# Patient Record
Sex: FEMALE | Race: WHITE | Employment: FULL TIME | URBAN - METROPOLITAN AREA
[De-identification: names, ages, dates, MRNs, and addresses within clinical notes are randomized per-mention and may not be internally consistent; named-entity substitution may affect disease eponyms.]

---

## 2017-02-20 ENCOUNTER — OFFICE VISIT (OUTPATIENT)
Dept: FAMILY MEDICINE CLINIC | Age: 52
End: 2017-02-20

## 2017-02-20 VITALS
TEMPERATURE: 97.7 F | BODY MASS INDEX: 38.54 KG/M2 | SYSTOLIC BLOOD PRESSURE: 125 MMHG | DIASTOLIC BLOOD PRESSURE: 77 MMHG | HEIGHT: 66 IN | OXYGEN SATURATION: 97 % | RESPIRATION RATE: 16 BRPM | WEIGHT: 239.8 LBS | HEART RATE: 84 BPM

## 2017-02-20 DIAGNOSIS — J33.8 NASAL SINUS POLYP: ICD-10-CM

## 2017-02-20 DIAGNOSIS — J32.0 SINUSITIS, MAXILLARY, CHRONIC: ICD-10-CM

## 2017-02-20 DIAGNOSIS — J45.990 ASTHMA, EXERCISE INDUCED: ICD-10-CM

## 2017-02-20 DIAGNOSIS — Z01.818 PREOP EXAMINATION: Primary | ICD-10-CM

## 2017-02-20 PROCEDURE — 99243 OFF/OP CNSLTJ NEW/EST LOW 30: CPT | Performed by: FAMILY MEDICINE

## 2017-02-20 RX ORDER — VILAZODONE HYDROCHLORIDE 10 MG/1
10 TABLET ORAL DAILY
COMMUNITY
End: 2018-07-13 | Stop reason: ALTCHOICE

## 2017-02-24 ENCOUNTER — TELEPHONE (OUTPATIENT)
Dept: FAMILY MEDICINE CLINIC | Age: 52
End: 2017-02-24

## 2017-03-16 DIAGNOSIS — R92.8 ABNORMALITY OF LEFT BREAST ON SCREENING MAMMOGRAPHY: Primary | ICD-10-CM

## 2017-04-24 DIAGNOSIS — K21.9 GASTROESOPHAGEAL REFLUX DISEASE, ESOPHAGITIS PRESENCE NOT SPECIFIED: ICD-10-CM

## 2017-04-24 RX ORDER — LANSOPRAZOLE 30 MG/1
30 CAPSULE, DELAYED RELEASE ORAL DAILY
Qty: 90 CAPSULE | Refills: 1 | Status: SHIPPED | OUTPATIENT
Start: 2017-04-24 | End: 2017-07-19 | Stop reason: SDUPTHER

## 2017-05-05 ENCOUNTER — OFFICE VISIT (OUTPATIENT)
Dept: FAMILY MEDICINE CLINIC | Age: 52
End: 2017-05-05

## 2017-05-05 VITALS
OXYGEN SATURATION: 100 % | DIASTOLIC BLOOD PRESSURE: 72 MMHG | SYSTOLIC BLOOD PRESSURE: 128 MMHG | HEIGHT: 67 IN | WEIGHT: 248 LBS | TEMPERATURE: 96.2 F | HEART RATE: 57 BPM | BODY MASS INDEX: 38.92 KG/M2

## 2017-05-05 DIAGNOSIS — M71.22 BAKER CYST, LEFT: ICD-10-CM

## 2017-05-05 DIAGNOSIS — M79.7 FIBROMYALGIA: Primary | ICD-10-CM

## 2017-05-05 DIAGNOSIS — F31.81 BIPOLAR II DISORDER (HCC): ICD-10-CM

## 2017-05-05 PROCEDURE — 99214 OFFICE O/P EST MOD 30 MIN: CPT | Performed by: FAMILY MEDICINE

## 2017-05-05 RX ORDER — MULTIVITAMIN WITH IRON
250 TABLET ORAL DAILY
COMMUNITY
End: 2018-04-23

## 2017-05-05 RX ORDER — NORTRIPTYLINE HYDROCHLORIDE 10 MG/1
10 CAPSULE ORAL NIGHTLY
Qty: 30 CAPSULE | Refills: 3 | Status: SHIPPED | OUTPATIENT
Start: 2017-05-05 | End: 2017-06-27

## 2017-06-27 ENCOUNTER — OFFICE VISIT (OUTPATIENT)
Dept: FAMILY MEDICINE CLINIC | Age: 52
End: 2017-06-27

## 2017-06-27 VITALS
WEIGHT: 244.8 LBS | OXYGEN SATURATION: 100 % | HEART RATE: 86 BPM | SYSTOLIC BLOOD PRESSURE: 121 MMHG | DIASTOLIC BLOOD PRESSURE: 67 MMHG | TEMPERATURE: 96.6 F | BODY MASS INDEX: 38.92 KG/M2

## 2017-06-27 DIAGNOSIS — F31.81 BIPOLAR II DISORDER (HCC): ICD-10-CM

## 2017-06-27 DIAGNOSIS — F98.8 ADD (ATTENTION DEFICIT DISORDER): Primary | ICD-10-CM

## 2017-06-27 PROCEDURE — 99214 OFFICE O/P EST MOD 30 MIN: CPT | Performed by: FAMILY MEDICINE

## 2017-06-27 RX ORDER — DEXTROAMPHETAMINE SACCHARATE, AMPHETAMINE ASPARTATE MONOHYDRATE, DEXTROAMPHETAMINE SULFATE AND AMPHETAMINE SULFATE 2.5; 2.5; 2.5; 2.5 MG/1; MG/1; MG/1; MG/1
10 CAPSULE, EXTENDED RELEASE ORAL DAILY
Qty: 30 CAPSULE | Refills: 0 | Status: SHIPPED | OUTPATIENT
Start: 2017-06-27 | End: 2017-07-31

## 2017-06-27 RX ORDER — LAMOTRIGINE 25 MG/1
50 TABLET ORAL 2 TIMES DAILY
Qty: 120 TABLET | Refills: 5 | Status: SHIPPED | OUTPATIENT
Start: 2017-06-27 | End: 2017-07-31 | Stop reason: SDUPTHER

## 2017-07-19 DIAGNOSIS — K21.9 GASTROESOPHAGEAL REFLUX DISEASE, ESOPHAGITIS PRESENCE NOT SPECIFIED: ICD-10-CM

## 2017-07-20 RX ORDER — LANSOPRAZOLE 30 MG/1
CAPSULE, DELAYED RELEASE ORAL
Qty: 90 CAPSULE | Refills: 1 | Status: SHIPPED | OUTPATIENT
Start: 2017-07-20 | End: 2017-09-06 | Stop reason: SDUPTHER

## 2017-07-31 ENCOUNTER — OFFICE VISIT (OUTPATIENT)
Dept: FAMILY MEDICINE CLINIC | Age: 52
End: 2017-07-31

## 2017-07-31 VITALS
DIASTOLIC BLOOD PRESSURE: 71 MMHG | WEIGHT: 245.8 LBS | TEMPERATURE: 96.5 F | SYSTOLIC BLOOD PRESSURE: 123 MMHG | RESPIRATION RATE: 16 BRPM | HEART RATE: 85 BPM | BODY MASS INDEX: 39.08 KG/M2

## 2017-07-31 DIAGNOSIS — F98.8 ADD (ATTENTION DEFICIT DISORDER): Primary | ICD-10-CM

## 2017-07-31 DIAGNOSIS — F31.81 BIPOLAR II DISORDER (HCC): ICD-10-CM

## 2017-07-31 DIAGNOSIS — S30.817A PERIANAL EXCORIATION: ICD-10-CM

## 2017-07-31 PROCEDURE — 99214 OFFICE O/P EST MOD 30 MIN: CPT | Performed by: FAMILY MEDICINE

## 2017-07-31 RX ORDER — DEXTROAMPHETAMINE SACCHARATE, AMPHETAMINE ASPARTATE MONOHYDRATE, DEXTROAMPHETAMINE SULFATE AND AMPHETAMINE SULFATE 3.75; 3.75; 3.75; 3.75 MG/1; MG/1; MG/1; MG/1
15 CAPSULE, EXTENDED RELEASE ORAL DAILY
Qty: 10 CAPSULE | Refills: 0 | Status: SHIPPED | OUTPATIENT
Start: 2017-07-31 | End: 2017-08-04

## 2017-07-31 RX ORDER — CLOTRIMAZOLE AND BETAMETHASONE DIPROPIONATE 10; .64 MG/G; MG/G
CREAM TOPICAL 2 TIMES DAILY
Qty: 45 G | Refills: 1 | Status: SHIPPED | OUTPATIENT
Start: 2017-07-31 | End: 2018-04-23

## 2017-07-31 RX ORDER — LAMOTRIGINE 25 MG/1
TABLET ORAL
Qty: 450 TABLET | Refills: 1 | Status: SHIPPED | OUTPATIENT
Start: 2017-07-31 | End: 2018-02-25 | Stop reason: SDUPTHER

## 2017-08-04 ENCOUNTER — PATIENT MESSAGE (OUTPATIENT)
Dept: FAMILY MEDICINE CLINIC | Age: 52
End: 2017-08-04

## 2017-08-04 DIAGNOSIS — F98.8 ADD (ATTENTION DEFICIT DISORDER): ICD-10-CM

## 2017-08-04 RX ORDER — DEXTROAMPHETAMINE SACCHARATE, AMPHETAMINE ASPARTATE MONOHYDRATE, DEXTROAMPHETAMINE SULFATE AND AMPHETAMINE SULFATE 2.5; 2.5; 2.5; 2.5 MG/1; MG/1; MG/1; MG/1
10 CAPSULE, EXTENDED RELEASE ORAL DAILY
Qty: 30 CAPSULE | Refills: 0 | Status: SHIPPED | OUTPATIENT
Start: 2017-08-10 | End: 2017-09-05 | Stop reason: SDUPTHER

## 2017-08-07 ENCOUNTER — PATIENT MESSAGE (OUTPATIENT)
Dept: FAMILY MEDICINE CLINIC | Age: 52
End: 2017-08-07

## 2017-08-24 ENCOUNTER — TELEPHONE (OUTPATIENT)
Dept: FAMILY MEDICINE CLINIC | Age: 52
End: 2017-08-24

## 2017-09-04 ENCOUNTER — PATIENT MESSAGE (OUTPATIENT)
Dept: FAMILY MEDICINE CLINIC | Age: 52
End: 2017-09-04

## 2017-09-04 DIAGNOSIS — F98.8 ADD (ATTENTION DEFICIT DISORDER): ICD-10-CM

## 2017-09-05 RX ORDER — DEXTROAMPHETAMINE SACCHARATE, AMPHETAMINE ASPARTATE MONOHYDRATE, DEXTROAMPHETAMINE SULFATE AND AMPHETAMINE SULFATE 2.5; 2.5; 2.5; 2.5 MG/1; MG/1; MG/1; MG/1
10 CAPSULE, EXTENDED RELEASE ORAL DAILY
Qty: 30 CAPSULE | Refills: 0 | Status: SHIPPED | OUTPATIENT
Start: 2017-09-05 | End: 2018-04-23

## 2017-09-06 DIAGNOSIS — L70.0 ACNE VULGARIS: ICD-10-CM

## 2017-09-06 DIAGNOSIS — K21.9 GASTROESOPHAGEAL REFLUX DISEASE, ESOPHAGITIS PRESENCE NOT SPECIFIED: ICD-10-CM

## 2017-09-06 RX ORDER — LANSOPRAZOLE 30 MG/1
CAPSULE, DELAYED RELEASE ORAL
Qty: 90 CAPSULE | Refills: 1 | Status: SHIPPED | OUTPATIENT
Start: 2017-09-06 | End: 2018-02-25 | Stop reason: SDUPTHER

## 2017-09-06 RX ORDER — SPIRONOLACTONE 25 MG/1
TABLET ORAL
Qty: 180 TABLET | Refills: 1 | Status: SHIPPED | OUTPATIENT
Start: 2017-09-06 | End: 2018-02-25 | Stop reason: SDUPTHER

## 2017-10-04 DIAGNOSIS — B00.9 HERPES SIMPLEX: ICD-10-CM

## 2017-10-04 RX ORDER — ACYCLOVIR 50 MG/G
OINTMENT TOPICAL
Qty: 3 TUBE | Refills: 3 | Status: SHIPPED | OUTPATIENT
Start: 2017-10-04 | End: 2018-12-10

## 2017-11-07 ENCOUNTER — PATIENT MESSAGE (OUTPATIENT)
Dept: FAMILY MEDICINE CLINIC | Age: 52
End: 2017-11-07

## 2017-11-07 NOTE — TELEPHONE ENCOUNTER
From: Judy Mosqueda  To: Mary Grace Moulton MD  Sent: 11/7/2017 1:29 PM EST  Subject: Prescription Question    Dr. Ainsley Smallwood, do I need to come see you for another prescription for 15 mg Adderall. I know I can't get the dosage correct. I tried the 10 for a while and then I went up on the Lamictal for daytime 75 and 25 at night. I over concentrate and I overthink things , so maybe I do need them combined to level it out. I think I need to skip all night time medication as I sleep better. just let me know it's been a minute so I may have to come in.  Thank you Agnes Silverio

## 2017-12-11 ENCOUNTER — OFFICE VISIT (OUTPATIENT)
Dept: FAMILY MEDICINE CLINIC | Age: 52
End: 2017-12-11

## 2017-12-11 VITALS
OXYGEN SATURATION: 100 % | WEIGHT: 247 LBS | DIASTOLIC BLOOD PRESSURE: 80 MMHG | BODY MASS INDEX: 39.27 KG/M2 | SYSTOLIC BLOOD PRESSURE: 132 MMHG | HEART RATE: 80 BPM

## 2017-12-11 DIAGNOSIS — F90.0 ATTENTION DEFICIT HYPERACTIVITY DISORDER (ADHD), PREDOMINANTLY INATTENTIVE TYPE: Primary | ICD-10-CM

## 2017-12-11 DIAGNOSIS — F41.9 ANXIETY: ICD-10-CM

## 2017-12-11 PROCEDURE — 99214 OFFICE O/P EST MOD 30 MIN: CPT | Performed by: FAMILY MEDICINE

## 2017-12-11 PROCEDURE — 1036F TOBACCO NON-USER: CPT | Performed by: FAMILY MEDICINE

## 2017-12-11 PROCEDURE — G8482 FLU IMMUNIZE ORDER/ADMIN: HCPCS | Performed by: FAMILY MEDICINE

## 2017-12-11 PROCEDURE — G8427 DOCREV CUR MEDS BY ELIG CLIN: HCPCS | Performed by: FAMILY MEDICINE

## 2017-12-11 PROCEDURE — 3017F COLORECTAL CA SCREEN DOC REV: CPT | Performed by: FAMILY MEDICINE

## 2017-12-11 PROCEDURE — G8417 CALC BMI ABV UP PARAM F/U: HCPCS | Performed by: FAMILY MEDICINE

## 2017-12-11 PROCEDURE — 3014F SCREEN MAMMO DOC REV: CPT | Performed by: FAMILY MEDICINE

## 2017-12-11 RX ORDER — DEXTROAMPHETAMINE SACCHARATE, AMPHETAMINE ASPARTATE MONOHYDRATE, DEXTROAMPHETAMINE SULFATE AND AMPHETAMINE SULFATE 3.75; 3.75; 3.75; 3.75 MG/1; MG/1; MG/1; MG/1
15 CAPSULE, EXTENDED RELEASE ORAL DAILY
Qty: 30 CAPSULE | Refills: 0 | Status: SHIPPED | OUTPATIENT
Start: 2018-01-10 | End: 2018-02-05 | Stop reason: SDUPTHER

## 2017-12-11 RX ORDER — DEXTROAMPHETAMINE SACCHARATE, AMPHETAMINE ASPARTATE MONOHYDRATE, DEXTROAMPHETAMINE SULFATE AND AMPHETAMINE SULFATE 3.75; 3.75; 3.75; 3.75 MG/1; MG/1; MG/1; MG/1
15 CAPSULE, EXTENDED RELEASE ORAL DAILY
Qty: 30 CAPSULE | Refills: 0 | Status: SHIPPED | OUTPATIENT
Start: 2018-02-09 | End: 2018-05-07 | Stop reason: SDUPTHER

## 2017-12-11 RX ORDER — DEXTROAMPHETAMINE SACCHARATE, AMPHETAMINE ASPARTATE MONOHYDRATE, DEXTROAMPHETAMINE SULFATE AND AMPHETAMINE SULFATE 2.5; 2.5; 2.5; 2.5 MG/1; MG/1; MG/1; MG/1
10 CAPSULE, EXTENDED RELEASE ORAL DAILY
Qty: 30 CAPSULE | Refills: 0 | Status: CANCELLED | OUTPATIENT
Start: 2017-12-11 | End: 2018-12-11

## 2017-12-11 RX ORDER — DEXTROAMPHETAMINE SACCHARATE, AMPHETAMINE ASPARTATE MONOHYDRATE, DEXTROAMPHETAMINE SULFATE AND AMPHETAMINE SULFATE 3.75; 3.75; 3.75; 3.75 MG/1; MG/1; MG/1; MG/1
15 CAPSULE, EXTENDED RELEASE ORAL DAILY
Qty: 30 CAPSULE | Refills: 0 | Status: SHIPPED | OUTPATIENT
Start: 2017-12-11 | End: 2018-02-05 | Stop reason: SDUPTHER

## 2017-12-11 NOTE — PROGRESS NOTES
Subjective:      Patient ID: Evens Claudio is a 47 y.o. female. SANTHOSH  Thao Bloom is here for follow up on ADD and to discuss a rash. She has recurrent herpes infection on her flank. She gets it about once a year. Itches before onset rash. Acyclovir has been effective in the past.  Broke out 2 to 3 days ago. ADD and depression are not well controlled. She has impulsive taking, \"too chatty\". Feels overwhelmed and anxious at times. This is better when on the Adderall XR 10 mg but not well enough controlled. Would like to go back to the Adderall XR 15 mg. The past few weeks she has trouble getting out of bed, low motivation. She does not feel depressed. No h/o seasonal depression. No appetite or sleep disturbance, no anhedonia, Not suicidal.   No mood swings, racing thoughts, decreased need for sleep. No major stress. Once she is up and doing something she does well. She is not sure the Lamictal does anything. She is starting to realize that she has unrealistic expectations for medication; if she has one bad day she thinks the medication is not working. She has been discussing this with her therapist.        Outpatient Prescriptions Marked as Taking for the 12/11/17 encounter (Office Visit) with Priti Joseph MD   Medication Sig Dispense Refill    acyclovir (ZOVIRAX) 5 % ointment Apply topically every 3 hours 3 Tube 3    spironolactone (ALDACTONE) 25 MG tablet Take 1 tablet by mouth two  times daily 180 tablet 1    lansoprazole (PREVACID) 30 MG delayed release capsule Take 1 capsule by mouth  daily 90 capsule 1    amphetamine-dextroamphetamine (ADDERALL XR) 10 MG extended release capsule Take 1 capsule by mouth daily .  30 capsule 0    clotrimazole-betamethasone (LOTRISONE) 1-0.05 % cream Apply topically 2 times daily 45 g 1    vilazodone HCl (VILAZODONE HCL) 10 MG TABS Take 10 mg by mouth daily      ACYCLOVIR PO Take by mouth      Glucosamine 500 MG CAPS Take by mouth      Vitamin D

## 2017-12-13 ENCOUNTER — TELEPHONE (OUTPATIENT)
Dept: FAMILY MEDICINE CLINIC | Age: 52
End: 2017-12-13

## 2017-12-28 ENCOUNTER — TELEPHONE (OUTPATIENT)
Dept: FAMILY MEDICINE CLINIC | Age: 52
End: 2017-12-28

## 2018-01-10 ENCOUNTER — TELEPHONE (OUTPATIENT)
Dept: FAMILY MEDICINE CLINIC | Age: 53
End: 2018-01-10

## 2018-01-24 ENCOUNTER — PATIENT MESSAGE (OUTPATIENT)
Dept: FAMILY MEDICINE CLINIC | Age: 53
End: 2018-01-24

## 2018-01-24 DIAGNOSIS — Z00.00 WELL ADULT EXAM: Primary | ICD-10-CM

## 2018-01-24 DIAGNOSIS — K58.2 IRRITABLE BOWEL SYNDROME WITH BOTH CONSTIPATION AND DIARRHEA: ICD-10-CM

## 2018-01-24 DIAGNOSIS — R53.83 FATIGUE, UNSPECIFIED TYPE: ICD-10-CM

## 2018-01-24 NOTE — TELEPHONE ENCOUNTER
From: Governor Lima  To: Vitor Lorenzo MD  Sent: 1/24/2018 11:28 AM EST  Subject: Non-Urgent Medical Question    Dr. Kanwal Roberto have an appt. On Feb. 5th for medical necessity forms. I was wondering if you could order some blood work prior. I have been extremely tired and having loose bowls. I think I had some stomach virus, yet it started prior. I am wondering if iron, sodium, etc. Is down. I figured we could discuss at appt. Along with forms.  Thank Shanna Morales

## 2018-01-26 DIAGNOSIS — Z00.00 WELL ADULT EXAM: ICD-10-CM

## 2018-01-26 DIAGNOSIS — R53.83 FATIGUE, UNSPECIFIED TYPE: ICD-10-CM

## 2018-01-26 DIAGNOSIS — K58.2 IRRITABLE BOWEL SYNDROME WITH BOTH CONSTIPATION AND DIARRHEA: ICD-10-CM

## 2018-01-26 LAB
A/G RATIO: 1.8 (ref 1.1–2.2)
ALBUMIN SERPL-MCNC: 4.6 G/DL (ref 3.4–5)
ALP BLD-CCNC: 75 U/L (ref 40–129)
ALT SERPL-CCNC: 32 U/L (ref 10–40)
ANION GAP SERPL CALCULATED.3IONS-SCNC: 13 MMOL/L (ref 3–16)
AST SERPL-CCNC: 24 U/L (ref 15–37)
BASOPHILS ABSOLUTE: 0.1 K/UL (ref 0–0.2)
BASOPHILS RELATIVE PERCENT: 0.7 %
BILIRUB SERPL-MCNC: 1.3 MG/DL (ref 0–1)
BUN BLDV-MCNC: 10 MG/DL (ref 7–20)
CALCIUM SERPL-MCNC: 9.7 MG/DL (ref 8.3–10.6)
CHLORIDE BLD-SCNC: 100 MMOL/L (ref 99–110)
CHOLESTEROL, TOTAL: 144 MG/DL (ref 0–199)
CO2: 26 MMOL/L (ref 21–32)
CREAT SERPL-MCNC: 0.6 MG/DL (ref 0.6–1.1)
EOSINOPHILS ABSOLUTE: 0.1 K/UL (ref 0–0.6)
EOSINOPHILS RELATIVE PERCENT: 1.4 %
FERRITIN: 107.8 NG/ML (ref 15–150)
GFR AFRICAN AMERICAN: >60
GFR NON-AFRICAN AMERICAN: >60
GLOBULIN: 2.5 G/DL
GLUCOSE BLD-MCNC: 94 MG/DL (ref 70–99)
HCT VFR BLD CALC: 42.6 % (ref 36–48)
HDLC SERPL-MCNC: 60 MG/DL (ref 40–60)
HEMOGLOBIN: 14 G/DL (ref 12–16)
IRON SATURATION: 32 % (ref 15–50)
IRON: 108 UG/DL (ref 37–145)
LDL CHOLESTEROL CALCULATED: 65 MG/DL
LYMPHOCYTES ABSOLUTE: 1.7 K/UL (ref 1–5.1)
LYMPHOCYTES RELATIVE PERCENT: 22.4 %
MCH RBC QN AUTO: 26.7 PG (ref 26–34)
MCHC RBC AUTO-ENTMCNC: 32.9 G/DL (ref 31–36)
MCV RBC AUTO: 81 FL (ref 80–100)
MONOCYTES ABSOLUTE: 0.6 K/UL (ref 0–1.3)
MONOCYTES RELATIVE PERCENT: 7.6 %
NEUTROPHILS ABSOLUTE: 5.2 K/UL (ref 1.7–7.7)
NEUTROPHILS RELATIVE PERCENT: 67.9 %
PDW BLD-RTO: 13.5 % (ref 12.4–15.4)
PLATELET # BLD: 299 K/UL (ref 135–450)
PMV BLD AUTO: 8.8 FL (ref 5–10.5)
POTASSIUM SERPL-SCNC: 4.6 MMOL/L (ref 3.5–5.1)
RBC # BLD: 5.26 M/UL (ref 4–5.2)
SODIUM BLD-SCNC: 139 MMOL/L (ref 136–145)
TOTAL IRON BINDING CAPACITY: 337 UG/DL (ref 260–445)
TOTAL PROTEIN: 7.1 G/DL (ref 6.4–8.2)
TRIGL SERPL-MCNC: 96 MG/DL (ref 0–150)
TSH REFLEX: 1.17 UIU/ML (ref 0.27–4.2)
VLDLC SERPL CALC-MCNC: 19 MG/DL
WBC # BLD: 7.7 K/UL (ref 4–11)

## 2018-02-05 ENCOUNTER — OFFICE VISIT (OUTPATIENT)
Dept: FAMILY MEDICINE CLINIC | Age: 53
End: 2018-02-05

## 2018-02-05 VITALS
SYSTOLIC BLOOD PRESSURE: 135 MMHG | WEIGHT: 244.6 LBS | OXYGEN SATURATION: 100 % | TEMPERATURE: 97.2 F | DIASTOLIC BLOOD PRESSURE: 68 MMHG | HEART RATE: 83 BPM | BODY MASS INDEX: 38.89 KG/M2

## 2018-02-05 DIAGNOSIS — F90.0 ATTENTION DEFICIT HYPERACTIVITY DISORDER (ADHD), PREDOMINANTLY INATTENTIVE TYPE: ICD-10-CM

## 2018-02-05 DIAGNOSIS — M79.7 FIBROMYALGIA: Primary | ICD-10-CM

## 2018-02-05 DIAGNOSIS — F31.81 BIPOLAR II DISORDER (HCC): ICD-10-CM

## 2018-02-05 PROCEDURE — 1036F TOBACCO NON-USER: CPT | Performed by: FAMILY MEDICINE

## 2018-02-05 PROCEDURE — G8427 DOCREV CUR MEDS BY ELIG CLIN: HCPCS | Performed by: FAMILY MEDICINE

## 2018-02-05 PROCEDURE — G8482 FLU IMMUNIZE ORDER/ADMIN: HCPCS | Performed by: FAMILY MEDICINE

## 2018-02-05 PROCEDURE — 3017F COLORECTAL CA SCREEN DOC REV: CPT | Performed by: FAMILY MEDICINE

## 2018-02-05 PROCEDURE — 3014F SCREEN MAMMO DOC REV: CPT | Performed by: FAMILY MEDICINE

## 2018-02-05 PROCEDURE — 99214 OFFICE O/P EST MOD 30 MIN: CPT | Performed by: FAMILY MEDICINE

## 2018-02-05 PROCEDURE — G8417 CALC BMI ABV UP PARAM F/U: HCPCS | Performed by: FAMILY MEDICINE

## 2018-02-05 RX ORDER — DEXTROAMPHETAMINE SACCHARATE, AMPHETAMINE ASPARTATE MONOHYDRATE, DEXTROAMPHETAMINE SULFATE AND AMPHETAMINE SULFATE 3.75; 3.75; 3.75; 3.75 MG/1; MG/1; MG/1; MG/1
15 CAPSULE, EXTENDED RELEASE ORAL DAILY
Qty: 30 CAPSULE | Refills: 0 | Status: SHIPPED | OUTPATIENT
Start: 2018-04-10 | End: 2018-05-07 | Stop reason: SDUPTHER

## 2018-02-05 RX ORDER — DEXTROAMPHETAMINE SACCHARATE, AMPHETAMINE ASPARTATE MONOHYDRATE, DEXTROAMPHETAMINE SULFATE AND AMPHETAMINE SULFATE 3.75; 3.75; 3.75; 3.75 MG/1; MG/1; MG/1; MG/1
15 CAPSULE, EXTENDED RELEASE ORAL DAILY
Qty: 30 CAPSULE | Refills: 0 | Status: SHIPPED | OUTPATIENT
Start: 2018-03-11 | End: 2018-05-07 | Stop reason: SDUPTHER

## 2018-02-05 NOTE — PROGRESS NOTES
(PREVACID) 30 MG delayed release capsule Take 1 capsule by mouth  daily 90 capsule 1    amphetamine-dextroamphetamine (ADDERALL XR) 10 MG extended release capsule Take 1 capsule by mouth daily . 30 capsule 0    lamoTRIgine (LAMICTAL) 25 MG tablet Take 3 tabs po QAM and 2 tabs po  tablet 1    clotrimazole-betamethasone (LOTRISONE) 1-0.05 % cream Apply topically 2 times daily 45 g 1    ACYCLOVIR PO Take by mouth      Glucosamine 500 MG CAPS Take by mouth      Vitamin D (CHOLECALCIFEROL) 1000 UNITS CAPS capsule Take 1,000 Units by mouth daily.  therapeutic multivitamin-minerals (THERAGRAN-M) tablet Take 1 tablet by mouth daily.  fluticasone (FLONASE) 50 MCG/ACT nasal spray 1 spray by Nasal route daily. Patient Active Problem List   Diagnosis    Irritable bowel syndrome    Esophageal reflux    Premenstrual tension syndrome    Migraine without aura    Allergic rhinitis    Asthma, exercise induced    ADD (attention deficit disorder)    Elevated liver enzymes    Herpes simplex    TMJ arthropathy    Hip pain    Low back strain    Trochanteric bursitis of right hip    Chondromalacia of patellofemoral joint    Fatigue    Bipolar II disorder (HCC)    Acne vulgaris    Fibromyalgia     PMH, PSH, SH, Problem list reviewed. Social History     Social History    Marital status:      Spouse name: N/A    Number of children: N/A    Years of education: N/A     Occupational History    Not on file. Social History Main Topics    Smoking status: Never Smoker    Smokeless tobacco: Never Used    Alcohol use No    Drug use: No    Sexual activity: Not on file     Other Topics Concern    Not on file     Social History Narrative    No narrative on file      Allergies   Allergen Reactions    Macrodantin [Nitrofurantoin]     Sulfa Antibiotics Hives        Review of Systems    Objective:   Physical Exam  NAD  Skin is warm and dry.    Mood and affect are normal.  No agitation

## 2018-02-08 ENCOUNTER — OFFICE VISIT (OUTPATIENT)
Dept: FAMILY MEDICINE CLINIC | Age: 53
End: 2018-02-08

## 2018-02-08 VITALS
DIASTOLIC BLOOD PRESSURE: 88 MMHG | SYSTOLIC BLOOD PRESSURE: 138 MMHG | OXYGEN SATURATION: 97 % | TEMPERATURE: 97.1 F | HEART RATE: 83 BPM | WEIGHT: 245 LBS | RESPIRATION RATE: 16 BRPM | BODY MASS INDEX: 38.95 KG/M2

## 2018-02-08 DIAGNOSIS — N30.90 CYSTITIS: Primary | ICD-10-CM

## 2018-02-08 DIAGNOSIS — J34.89 SINUS PRESSURE: ICD-10-CM

## 2018-02-08 DIAGNOSIS — R30.0 DYSURIA: ICD-10-CM

## 2018-02-08 LAB
BILIRUBIN, POC: NEGATIVE
BLOOD URINE, POC: NORMAL
CLARITY, POC: CLEAR
COLOR, POC: YELLOW
GLUCOSE URINE, POC: NEGATIVE
KETONES, POC: NEGATIVE
LEUKOCYTE EST, POC: NEGATIVE
NITRITE, POC: NEGATIVE
PH, POC: 6
PROTEIN, POC: NEGATIVE
SPECIFIC GRAVITY, POC: 1
UROBILINOGEN, POC: 0.2

## 2018-02-08 PROCEDURE — G8482 FLU IMMUNIZE ORDER/ADMIN: HCPCS | Performed by: FAMILY MEDICINE

## 2018-02-08 PROCEDURE — 1036F TOBACCO NON-USER: CPT | Performed by: FAMILY MEDICINE

## 2018-02-08 PROCEDURE — G8417 CALC BMI ABV UP PARAM F/U: HCPCS | Performed by: FAMILY MEDICINE

## 2018-02-08 PROCEDURE — 99213 OFFICE O/P EST LOW 20 MIN: CPT | Performed by: FAMILY MEDICINE

## 2018-02-08 PROCEDURE — 3014F SCREEN MAMMO DOC REV: CPT | Performed by: FAMILY MEDICINE

## 2018-02-08 PROCEDURE — G8427 DOCREV CUR MEDS BY ELIG CLIN: HCPCS | Performed by: FAMILY MEDICINE

## 2018-02-08 PROCEDURE — 81002 URINALYSIS NONAUTO W/O SCOPE: CPT | Performed by: FAMILY MEDICINE

## 2018-02-08 PROCEDURE — 3017F COLORECTAL CA SCREEN DOC REV: CPT | Performed by: FAMILY MEDICINE

## 2018-02-08 RX ORDER — LEVOFLOXACIN 500 MG/1
500 TABLET, FILM COATED ORAL DAILY
Qty: 7 TABLET | Refills: 0 | Status: SHIPPED | OUTPATIENT
Start: 2018-02-08 | End: 2018-02-15

## 2018-02-08 NOTE — PROGRESS NOTES
[facility]  Progress Note  Radha VarmaDO Blane  1965 02/08/18    Chief Complaint:   Blane Ring is a 47 y.o. female who is here for UTI sx. HPI:   c/o congestion, and urgency, mild dysuria, chills. Known hcronic sinus pressure. Rosa Elena Diaz Has had a sinus procedure March 2017. Began experiencing URI symptoms in the past 5 days. ROS negative for headache, vision changes, chest pain, shortness of breath, abdominal pain, urinary sx, bowel changes. Past medical, surgical, and social history reviewed. Medications and allergies reviewed. Allergies   Allergen Reactions    Macrodantin [Nitrofurantoin]     Sulfa Antibiotics Hives     Prior to Visit Medications    Medication Sig Taking? Authorizing Provider   levofloxacin (LEVAQUIN) 500 MG tablet Take 1 tablet by mouth daily for 7 days Yes Teresa Doss,    amphetamine-dextroamphetamine (ADDERALL XR) 15 MG extended release capsule Take 1 capsule by mouth daily. Yes Matthew Funk MD   amphetamine-dextroamphetamine (ADDERALL XR) 15 MG extended release capsule Take 1 capsule by mouth daily. Yes Matthew Funk MD   amphetamine-dextroamphetamine (ADDERALL XR) 15 MG extended release capsule Take 1 capsule by mouth daily . Yes Matthew Funk MD   acyclovir (ZOVIRAX) 5 % ointment Apply topically every 3 hours Yes Matthew Funk MD   spironolactone (ALDACTONE) 25 MG tablet Take 1 tablet by mouth two  times daily Yes Matthew Funk MD   lansoprazole (PREVACID) 30 MG delayed release capsule Take 1 capsule by mouth  daily Yes Matthew Funk MD   amphetamine-dextroamphetamine (ADDERALL XR) 10 MG extended release capsule Take 1 capsule by mouth daily .  Yes Matthew Funk MD   lamoTRIgine (LAMICTAL) 25 MG tablet Take 3 tabs po QAM and 2 tabs po QHS Yes Matthew Funk MD   clotrimazole-betamethasone (LOTRISONE) 1-0.05 % cream Apply topically 2 times daily Yes Matthew Funk MD   magnesium (MAGNESIUM-OXIDE) 250 MG TABS tablet Take 250 mg by mouth daily Yes Historical Provider, MD   vilazodone HCl (VILAZODONE HCL) 10 MG TABS Take 10 mg by mouth daily Yes Historical Provider, MD   ACYCLOVIR PO Take by mouth Yes Historical Provider, MD   Glucosamine 500 MG CAPS Take by mouth Yes Historical Provider, MD   Vitamin D (CHOLECALCIFEROL) 1000 UNITS CAPS capsule Take 1,000 Units by mouth daily. Yes Historical Provider, MD   therapeutic multivitamin-minerals (THERAGRAN-M) tablet Take 1 tablet by mouth daily. Yes Historical Provider, MD   fluticasone (FLONASE) 50 MCG/ACT nasal spray 1 spray by Nasal route daily.  Yes Historical Provider, MD          Vitals:    02/08/18 1359   BP: 138/88   Pulse: 83   Resp: 16   Temp: 97.1 °F (36.2 °C)   SpO2: 97%   Weight: 245 lb (111.1 kg)      Wt Readings from Last 3 Encounters:   02/08/18 245 lb (111.1 kg)   02/05/18 244 lb 9.6 oz (110.9 kg)   12/11/17 247 lb (112 kg)     BP Readings from Last 3 Encounters:   02/08/18 138/88   02/05/18 135/68   12/11/17 132/80       Patient Active Problem List   Diagnosis    Irritable bowel syndrome    Esophageal reflux    Premenstrual tension syndrome    Migraine without aura    Allergic rhinitis    Asthma, exercise induced    ADD (attention deficit disorder)    Elevated liver enzymes    Herpes simplex    TMJ arthropathy    Hip pain    Low back strain    Trochanteric bursitis of right hip    Chondromalacia of patellofemoral joint    Fatigue    Bipolar II disorder ([address]    Acne vulgaris    Fibromyalgia         Immunization History   Administered Date(s) Administered    Influenza Virus Vaccine 10/18/2017    Influenza, Arpit Delacruz, 3 yrs and older, IM, Preservative Free 09/30/2016    PPD Test 09/27/2011    Tdap (Boostrix, Adacel) 12/27/2006       Past Medical History:   Diagnosis Date    Benign neoplasm of breast     Patellofemoral dysfunction 8/14/2012    Personal history of cervical dysplasia     Pilonidal cyst without mention of abscess     Sciatica of right side 11/28/2011    Urethral stricture unspecified      Past Surgical History:   Procedure Laterality Date    ARTHROPLASTY      temporomandibular joint    ENDOMETRIAL ABLATION  7/4056    Dr. Juan Antonio LEAL      cervical conization    NASAL SEPTUM SURGERY      POLYPECTOMY      nasal endoscopy    SINUS SURGERY  03/2017    Endoscopic sinus surgery     Family History   Problem Relation Age of Onset    Cancer Mother      skin cancer    Other Father      PVD    Other Brother      a&w    Hypertension Paternal Grandmother     Other Paternal Grandfather      myeloma     Social History     Social History    Marital status:      Spouse name: N/A    Number of children: N/A    Years of education: N/A     Occupational History    Not on file. Social History Main Topics    Smoking status: Never Smoker    Smokeless tobacco: Never Used    Alcohol use No    Drug use: No    Sexual activity: Not on file     Other Topics Concern    Not on file     Social History Narrative    No narrative on file       O: /88   Pulse 83   Temp 97.1 °F (36.2 °C)   Resp 16   Wt 245 lb (111.1 kg)   LMP 01/10/2018 (Exact Date)   SpO2 97%   Breastfeeding? No   BMI 38.95 kg/m²   Physical Exam  GEN: No acute distress, cooperative, well nourished, alert. HEENT: PEERLA, EOMI , normocephalic/atraumatic, nares and oropharynx clear. Mucus membranes normal, Tympanic membranes clear bilaterally. Neck: soft, supple, no thyromegaly, mass, no Lymphadenopathy  CV: Regular rate and rhythm, no murmur, rubs, gallops. No edema. Resp: Clear to auscultation bilaterally good air entry bilaterally  No crackles, wheeze. Breathing comfortably. Psych: normal affect. Neuro: AOx3  Abd: mild TTP of the pelvic region. No CVAT. ASSESSMENT  1. Cystitis  levofloxacin (LEVAQUIN) 500 MG tablet   2. Sinus pressure  levofloxacin (LEVAQUIN) 500 MG tablet   3.  Dysuria  POCT Urinalysis no Micro

## 2018-02-10 LAB — URINE CULTURE, ROUTINE: NORMAL

## 2018-03-29 ENCOUNTER — TELEPHONE (OUTPATIENT)
Dept: FAMILY MEDICINE CLINIC | Age: 53
End: 2018-03-29

## 2018-04-23 ENCOUNTER — OFFICE VISIT (OUTPATIENT)
Dept: FAMILY MEDICINE CLINIC | Age: 53
End: 2018-04-23

## 2018-04-23 VITALS
SYSTOLIC BLOOD PRESSURE: 120 MMHG | OXYGEN SATURATION: 99 % | WEIGHT: 247 LBS | HEART RATE: 67 BPM | TEMPERATURE: 96.7 F | RESPIRATION RATE: 16 BRPM | BODY MASS INDEX: 39.27 KG/M2 | DIASTOLIC BLOOD PRESSURE: 84 MMHG

## 2018-04-23 DIAGNOSIS — M79.7 FIBROMYALGIA: Primary | ICD-10-CM

## 2018-04-23 DIAGNOSIS — S39.012A STRAIN OF LUMBAR REGION, INITIAL ENCOUNTER: ICD-10-CM

## 2018-04-23 PROCEDURE — 3017F COLORECTAL CA SCREEN DOC REV: CPT | Performed by: FAMILY MEDICINE

## 2018-04-23 PROCEDURE — G8427 DOCREV CUR MEDS BY ELIG CLIN: HCPCS | Performed by: FAMILY MEDICINE

## 2018-04-23 PROCEDURE — G8417 CALC BMI ABV UP PARAM F/U: HCPCS | Performed by: FAMILY MEDICINE

## 2018-04-23 PROCEDURE — 1036F TOBACCO NON-USER: CPT | Performed by: FAMILY MEDICINE

## 2018-04-23 PROCEDURE — 99214 OFFICE O/P EST MOD 30 MIN: CPT | Performed by: FAMILY MEDICINE

## 2018-04-23 RX ORDER — BACLOFEN 10 MG/1
10 TABLET ORAL 3 TIMES DAILY
Qty: 90 TABLET | Refills: 5 | Status: SHIPPED | OUTPATIENT
Start: 2018-04-23 | End: 2018-07-13 | Stop reason: ALTCHOICE

## 2018-04-23 ASSESSMENT — PATIENT HEALTH QUESTIONNAIRE - PHQ9
SUM OF ALL RESPONSES TO PHQ9 QUESTIONS 1 & 2: 0
2. FEELING DOWN, DEPRESSED OR HOPELESS: 0
1. LITTLE INTEREST OR PLEASURE IN DOING THINGS: 0
SUM OF ALL RESPONSES TO PHQ QUESTIONS 1-9: 0

## 2018-05-07 ENCOUNTER — OFFICE VISIT (OUTPATIENT)
Dept: FAMILY MEDICINE CLINIC | Age: 53
End: 2018-05-07

## 2018-05-07 VITALS
DIASTOLIC BLOOD PRESSURE: 71 MMHG | SYSTOLIC BLOOD PRESSURE: 119 MMHG | BODY MASS INDEX: 38.67 KG/M2 | TEMPERATURE: 97.2 F | OXYGEN SATURATION: 98 % | RESPIRATION RATE: 12 BRPM | WEIGHT: 243.2 LBS | HEART RATE: 85 BPM

## 2018-05-07 DIAGNOSIS — S39.012A STRAIN OF LUMBAR REGION, INITIAL ENCOUNTER: Primary | ICD-10-CM

## 2018-05-07 DIAGNOSIS — M79.7 FIBROMYALGIA: ICD-10-CM

## 2018-05-07 DIAGNOSIS — E66.09 CLASS 2 OBESITY DUE TO EXCESS CALORIES WITHOUT SERIOUS COMORBIDITY WITH BODY MASS INDEX (BMI) OF 38.0 TO 38.9 IN ADULT: ICD-10-CM

## 2018-05-07 DIAGNOSIS — F90.0 ATTENTION DEFICIT HYPERACTIVITY DISORDER (ADHD), PREDOMINANTLY INATTENTIVE TYPE: ICD-10-CM

## 2018-05-07 PROCEDURE — G8417 CALC BMI ABV UP PARAM F/U: HCPCS | Performed by: FAMILY MEDICINE

## 2018-05-07 PROCEDURE — 3017F COLORECTAL CA SCREEN DOC REV: CPT | Performed by: FAMILY MEDICINE

## 2018-05-07 PROCEDURE — G8427 DOCREV CUR MEDS BY ELIG CLIN: HCPCS | Performed by: FAMILY MEDICINE

## 2018-05-07 PROCEDURE — 1036F TOBACCO NON-USER: CPT | Performed by: FAMILY MEDICINE

## 2018-05-07 PROCEDURE — 99214 OFFICE O/P EST MOD 30 MIN: CPT | Performed by: FAMILY MEDICINE

## 2018-05-07 RX ORDER — DEXTROAMPHETAMINE SACCHARATE, AMPHETAMINE ASPARTATE MONOHYDRATE, DEXTROAMPHETAMINE SULFATE AND AMPHETAMINE SULFATE 3.75; 3.75; 3.75; 3.75 MG/1; MG/1; MG/1; MG/1
15 CAPSULE, EXTENDED RELEASE ORAL DAILY
Qty: 30 CAPSULE | Refills: 0 | Status: SHIPPED | OUTPATIENT
Start: 2018-06-06 | End: 2018-07-13 | Stop reason: DRUGHIGH

## 2018-05-07 RX ORDER — DEXTROAMPHETAMINE SACCHARATE, AMPHETAMINE ASPARTATE MONOHYDRATE, DEXTROAMPHETAMINE SULFATE AND AMPHETAMINE SULFATE 3.75; 3.75; 3.75; 3.75 MG/1; MG/1; MG/1; MG/1
15 CAPSULE, EXTENDED RELEASE ORAL DAILY
Qty: 30 CAPSULE | Refills: 0 | Status: SHIPPED | OUTPATIENT
Start: 2018-07-06 | End: 2018-07-13 | Stop reason: DRUGHIGH

## 2018-05-07 RX ORDER — DEXTROAMPHETAMINE SACCHARATE, AMPHETAMINE ASPARTATE MONOHYDRATE, DEXTROAMPHETAMINE SULFATE AND AMPHETAMINE SULFATE 3.75; 3.75; 3.75; 3.75 MG/1; MG/1; MG/1; MG/1
15 CAPSULE, EXTENDED RELEASE ORAL DAILY
Qty: 30 CAPSULE | Refills: 0 | Status: SHIPPED | OUTPATIENT
Start: 2018-05-07 | End: 2018-07-13 | Stop reason: DRUGHIGH

## 2018-05-17 ENCOUNTER — OFFICE VISIT (OUTPATIENT)
Dept: FAMILY MEDICINE CLINIC | Age: 53
End: 2018-05-17

## 2018-05-17 VITALS
DIASTOLIC BLOOD PRESSURE: 82 MMHG | WEIGHT: 243 LBS | BODY MASS INDEX: 38.14 KG/M2 | HEART RATE: 82 BPM | HEIGHT: 67 IN | RESPIRATION RATE: 16 BRPM | OXYGEN SATURATION: 98 % | TEMPERATURE: 97.6 F | SYSTOLIC BLOOD PRESSURE: 128 MMHG

## 2018-05-17 DIAGNOSIS — J45.990 ASTHMA, EXERCISE INDUCED: ICD-10-CM

## 2018-05-17 DIAGNOSIS — J30.9 ALLERGIC RHINITIS, UNSPECIFIED CHRONICITY, UNSPECIFIED SEASONALITY, UNSPECIFIED TRIGGER: ICD-10-CM

## 2018-05-17 DIAGNOSIS — J01.00 ACUTE MAXILLARY SINUSITIS, RECURRENCE NOT SPECIFIED: Primary | ICD-10-CM

## 2018-05-17 PROCEDURE — G8417 CALC BMI ABV UP PARAM F/U: HCPCS | Performed by: FAMILY MEDICINE

## 2018-05-17 PROCEDURE — 1036F TOBACCO NON-USER: CPT | Performed by: FAMILY MEDICINE

## 2018-05-17 PROCEDURE — 99214 OFFICE O/P EST MOD 30 MIN: CPT | Performed by: FAMILY MEDICINE

## 2018-05-17 PROCEDURE — 3017F COLORECTAL CA SCREEN DOC REV: CPT | Performed by: FAMILY MEDICINE

## 2018-05-17 PROCEDURE — G8427 DOCREV CUR MEDS BY ELIG CLIN: HCPCS | Performed by: FAMILY MEDICINE

## 2018-06-26 DIAGNOSIS — K21.9 GASTROESOPHAGEAL REFLUX DISEASE, ESOPHAGITIS PRESENCE NOT SPECIFIED: ICD-10-CM

## 2018-06-27 RX ORDER — LANSOPRAZOLE 30 MG/1
CAPSULE, DELAYED RELEASE ORAL
Qty: 90 CAPSULE | Refills: 0 | Status: SHIPPED | OUTPATIENT
Start: 2018-06-27 | End: 2018-09-18 | Stop reason: SDUPTHER

## 2018-07-13 ENCOUNTER — OFFICE VISIT (OUTPATIENT)
Dept: FAMILY MEDICINE CLINIC | Age: 53
End: 2018-07-13

## 2018-07-13 VITALS
SYSTOLIC BLOOD PRESSURE: 120 MMHG | RESPIRATION RATE: 14 BRPM | OXYGEN SATURATION: 94 % | TEMPERATURE: 96.1 F | WEIGHT: 241 LBS | DIASTOLIC BLOOD PRESSURE: 85 MMHG | BODY MASS INDEX: 37.83 KG/M2 | HEIGHT: 67 IN | HEART RATE: 88 BPM

## 2018-07-13 DIAGNOSIS — F31.81 BIPOLAR II DISORDER (HCC): ICD-10-CM

## 2018-07-13 DIAGNOSIS — M79.7 FIBROMYALGIA: ICD-10-CM

## 2018-07-13 DIAGNOSIS — G47.9 SLEEP DISORDER: ICD-10-CM

## 2018-07-13 DIAGNOSIS — F90.2 ATTENTION DEFICIT HYPERACTIVITY DISORDER (ADHD), COMBINED TYPE: Primary | ICD-10-CM

## 2018-07-13 DIAGNOSIS — Z23 NEED FOR TDAP VACCINATION: ICD-10-CM

## 2018-07-13 PROCEDURE — 90715 TDAP VACCINE 7 YRS/> IM: CPT | Performed by: FAMILY MEDICINE

## 2018-07-13 PROCEDURE — G8427 DOCREV CUR MEDS BY ELIG CLIN: HCPCS | Performed by: FAMILY MEDICINE

## 2018-07-13 PROCEDURE — 1036F TOBACCO NON-USER: CPT | Performed by: FAMILY MEDICINE

## 2018-07-13 PROCEDURE — 90471 IMMUNIZATION ADMIN: CPT | Performed by: FAMILY MEDICINE

## 2018-07-13 PROCEDURE — 3017F COLORECTAL CA SCREEN DOC REV: CPT | Performed by: FAMILY MEDICINE

## 2018-07-13 PROCEDURE — 99214 OFFICE O/P EST MOD 30 MIN: CPT | Performed by: FAMILY MEDICINE

## 2018-07-13 PROCEDURE — G8417 CALC BMI ABV UP PARAM F/U: HCPCS | Performed by: FAMILY MEDICINE

## 2018-07-13 RX ORDER — DEXTROAMPHETAMINE SACCHARATE, AMPHETAMINE ASPARTATE MONOHYDRATE, DEXTROAMPHETAMINE SULFATE AND AMPHETAMINE SULFATE 2.5; 2.5; 2.5; 2.5 MG/1; MG/1; MG/1; MG/1
10 CAPSULE, EXTENDED RELEASE ORAL DAILY
Qty: 30 CAPSULE | Refills: 0 | Status: SHIPPED | OUTPATIENT
Start: 2018-08-12 | End: 2018-10-08 | Stop reason: SDUPTHER

## 2018-07-13 RX ORDER — DEXTROAMPHETAMINE SACCHARATE, AMPHETAMINE ASPARTATE, DEXTROAMPHETAMINE SULFATE AND AMPHETAMINE SULFATE 1.25; 1.25; 1.25; 1.25 MG/1; MG/1; MG/1; MG/1
5-10 TABLET ORAL DAILY
Qty: 60 TABLET | Refills: 0 | Status: SHIPPED | OUTPATIENT
Start: 2018-09-11 | End: 2018-10-08 | Stop reason: SDUPTHER

## 2018-07-13 RX ORDER — DEXTROAMPHETAMINE SACCHARATE, AMPHETAMINE ASPARTATE MONOHYDRATE, DEXTROAMPHETAMINE SULFATE AND AMPHETAMINE SULFATE 2.5; 2.5; 2.5; 2.5 MG/1; MG/1; MG/1; MG/1
10 CAPSULE, EXTENDED RELEASE ORAL DAILY
Qty: 30 CAPSULE | Refills: 0 | Status: SHIPPED | OUTPATIENT
Start: 2018-07-13 | End: 2018-10-08 | Stop reason: SDUPTHER

## 2018-07-13 RX ORDER — DEXTROAMPHETAMINE SACCHARATE, AMPHETAMINE ASPARTATE MONOHYDRATE, DEXTROAMPHETAMINE SULFATE AND AMPHETAMINE SULFATE 2.5; 2.5; 2.5; 2.5 MG/1; MG/1; MG/1; MG/1
10 CAPSULE, EXTENDED RELEASE ORAL DAILY
Qty: 30 CAPSULE | Refills: 0 | Status: SHIPPED | OUTPATIENT
Start: 2018-09-11 | End: 2018-10-08 | Stop reason: SDUPTHER

## 2018-07-13 RX ORDER — DEXTROAMPHETAMINE SACCHARATE, AMPHETAMINE ASPARTATE, DEXTROAMPHETAMINE SULFATE AND AMPHETAMINE SULFATE 1.25; 1.25; 1.25; 1.25 MG/1; MG/1; MG/1; MG/1
5-10 TABLET ORAL DAILY
Qty: 60 TABLET | Refills: 0 | Status: SHIPPED | OUTPATIENT
Start: 2018-07-13 | End: 2018-10-08 | Stop reason: SDUPTHER

## 2018-07-13 RX ORDER — DEXTROAMPHETAMINE SACCHARATE, AMPHETAMINE ASPARTATE, DEXTROAMPHETAMINE SULFATE AND AMPHETAMINE SULFATE 1.25; 1.25; 1.25; 1.25 MG/1; MG/1; MG/1; MG/1
5-10 TABLET ORAL DAILY
Qty: 60 TABLET | Refills: 0 | Status: SHIPPED | OUTPATIENT
Start: 2018-08-12 | End: 2018-10-08 | Stop reason: SDUPTHER

## 2018-07-13 NOTE — PROGRESS NOTES
a&w    Hypertension Paternal Grandmother     Other Paternal Grandfather         myeloma       Social History   Substance Use Topics    Smoking status: Never Smoker    Smokeless tobacco: Never Used    Alcohol use No            Review of Systems  Review of Systems    Objective:   Physical Exam  Vitals:    07/13/18 1505 07/13/18 1557   BP: (!) 146/82 120/85   Pulse: 88    Resp: 14    Temp: 96.1 °F (35.6 °C)    TempSrc: Oral    SpO2: 94%    Weight: 241 lb (109.3 kg)    Height: 5' 6.75\" (1.695 m)      Physical Exam  NAD  Skin is warm and dry. Mood and affect are mildly anxious. No agitation or psychomotor retardation. No pressured speech, grandiosity or tangential thoughts. Insight and judgement are intact. Not suicidal.   The neck is supple and free of adenopathy or masses, the thyroid is normal without enlargement or nodules. + trigger pt tenderness C2, C6, trapezius, chest wall, hips, knees, SI.  Mild   Chest is clear, no wheezing or rales. Normal symmetric air entry throughout both lung fields. Heart regular with normal rate, no murmer or gallop       Assessment:       Diagnosis Orders   1. Attention deficit hyperactivity disorder (ADHD), combined type  amphetamine-dextroamphetamine (ADDERALL XR) 10 MG extended release capsule    amphetamine-dextroamphetamine (ADDERALL XR) 10 MG extended release capsule    amphetamine-dextroamphetamine (ADDERALL XR) 10 MG extended release capsule    amphetamine-dextroamphetamine (ADDERALL, 5MG,) 5 MG tablet    amphetamine-dextroamphetamine (ADDERALL, 5MG,) 5 MG tablet    amphetamine-dextroamphetamine (ADDERALL, 5MG,) 5 MG tablet  Try lower dose to decrease anxiety with short acting to extend duration. Consider trial Strattera. 2. Fibromyalgia  Low impact exercise, stretching. 3. Bipolar II disorder (HCC)  Vs ADD alone. I suspect she has ADD only. Has had differing opinions from several psychiatrists. Monitor closely.  Discussed with pt.    4. Sleep disorder [address]  Rule out apnea - uncontrolled apnea would make mood disorder more difficult to tx.   5. Need for Tdap vaccination  Tdap (age 10y-63y) IM (ADACEL)          Plan:      Side effects of current medications reviewed and questions answered. Follow up in 3 months or prn.

## 2018-08-13 DIAGNOSIS — F90.2 ATTENTION DEFICIT HYPERACTIVITY DISORDER (ADHD), COMBINED TYPE: ICD-10-CM

## 2018-08-13 RX ORDER — DEXTROAMPHETAMINE SACCHARATE, AMPHETAMINE ASPARTATE MONOHYDRATE, DEXTROAMPHETAMINE SULFATE AND AMPHETAMINE SULFATE 2.5; 2.5; 2.5; 2.5 MG/1; MG/1; MG/1; MG/1
10 CAPSULE, EXTENDED RELEASE ORAL DAILY
Qty: 30 CAPSULE | Refills: 0 | Status: CANCELLED | OUTPATIENT
Start: 2018-08-13 | End: 2018-09-12

## 2018-08-13 NOTE — TELEPHONE ENCOUNTER
7-13-18 last ov  Disp 3 months  Has follow up on 10-8-18  Looks like patient was given three scripts at last ov received by pharmacy

## 2018-08-13 NOTE — TELEPHONE ENCOUNTER
From: Diego Pack  Sent: 8/12/2018 9:36 PM EDT  Subject: Medication Renewal Request    Mirta Mccurdy.  Elnita [employer] would like a refill of the following medications:     amphetamine-dextroamphetamine (ADDERALL XR) 10 MG extended release capsule Krystal Guillaume MD]    Preferred pharmacy: [facility] [address], [address]

## 2018-09-04 ENCOUNTER — TELEPHONE (OUTPATIENT)
Dept: FAMILY MEDICINE CLINIC | Age: 53
End: 2018-09-04

## 2018-09-05 ENCOUNTER — OFFICE VISIT (OUTPATIENT)
Dept: FAMILY MEDICINE CLINIC | Age: 53
End: 2018-09-05

## 2018-09-05 VITALS
RESPIRATION RATE: 12 BRPM | DIASTOLIC BLOOD PRESSURE: 83 MMHG | HEIGHT: 66 IN | TEMPERATURE: 97.7 F | OXYGEN SATURATION: 95 % | WEIGHT: 237.5 LBS | HEART RATE: 95 BPM | BODY MASS INDEX: 38.17 KG/M2 | SYSTOLIC BLOOD PRESSURE: 133 MMHG

## 2018-09-05 DIAGNOSIS — J06.9 VIRAL URI: Primary | ICD-10-CM

## 2018-09-05 PROCEDURE — 1036F TOBACCO NON-USER: CPT | Performed by: FAMILY MEDICINE

## 2018-09-05 PROCEDURE — G8417 CALC BMI ABV UP PARAM F/U: HCPCS | Performed by: FAMILY MEDICINE

## 2018-09-05 PROCEDURE — 3017F COLORECTAL CA SCREEN DOC REV: CPT | Performed by: FAMILY MEDICINE

## 2018-09-05 PROCEDURE — 99213 OFFICE O/P EST LOW 20 MIN: CPT | Performed by: FAMILY MEDICINE

## 2018-09-05 PROCEDURE — G8427 DOCREV CUR MEDS BY ELIG CLIN: HCPCS | Performed by: FAMILY MEDICINE

## 2018-09-05 RX ORDER — AMOXICILLIN AND CLAVULANATE POTASSIUM 875; 125 MG/1; MG/1
1 TABLET, FILM COATED ORAL 2 TIMES DAILY
Qty: 20 TABLET | Refills: 0 | Status: SHIPPED | OUTPATIENT
Start: 2018-09-05 | End: 2018-09-15

## 2018-09-18 DIAGNOSIS — K21.9 GASTROESOPHAGEAL REFLUX DISEASE, ESOPHAGITIS PRESENCE NOT SPECIFIED: ICD-10-CM

## 2018-09-18 RX ORDER — LANSOPRAZOLE 30 MG/1
CAPSULE, DELAYED RELEASE ORAL
Qty: 90 CAPSULE | Refills: 0 | Status: SHIPPED | OUTPATIENT
Start: 2018-09-18 | End: 2018-12-04 | Stop reason: SDUPTHER

## 2018-10-08 ENCOUNTER — OFFICE VISIT (OUTPATIENT)
Dept: FAMILY MEDICINE CLINIC | Age: 53
End: 2018-10-08
Payer: COMMERCIAL

## 2018-10-08 VITALS
BODY MASS INDEX: 37.2 KG/M2 | TEMPERATURE: 98.5 F | RESPIRATION RATE: 14 BRPM | DIASTOLIC BLOOD PRESSURE: 74 MMHG | HEIGHT: 67 IN | HEART RATE: 81 BPM | WEIGHT: 237 LBS | OXYGEN SATURATION: 96 % | SYSTOLIC BLOOD PRESSURE: 123 MMHG

## 2018-10-08 DIAGNOSIS — L70.0 ACNE VULGARIS: ICD-10-CM

## 2018-10-08 DIAGNOSIS — F90.2 ATTENTION DEFICIT HYPERACTIVITY DISORDER (ADHD), COMBINED TYPE: Primary | ICD-10-CM

## 2018-10-08 DIAGNOSIS — S39.012A STRAIN OF LUMBAR REGION, INITIAL ENCOUNTER: ICD-10-CM

## 2018-10-08 PROCEDURE — 99214 OFFICE O/P EST MOD 30 MIN: CPT | Performed by: FAMILY MEDICINE

## 2018-10-08 RX ORDER — DEXTROAMPHETAMINE SACCHARATE, AMPHETAMINE ASPARTATE, DEXTROAMPHETAMINE SULFATE AND AMPHETAMINE SULFATE 1.25; 1.25; 1.25; 1.25 MG/1; MG/1; MG/1; MG/1
5-10 TABLET ORAL DAILY
Qty: 60 TABLET | Refills: 0 | Status: SHIPPED | OUTPATIENT
Start: 2018-10-08 | End: 2019-05-10

## 2018-10-08 RX ORDER — DEXTROAMPHETAMINE SACCHARATE, AMPHETAMINE ASPARTATE MONOHYDRATE, DEXTROAMPHETAMINE SULFATE AND AMPHETAMINE SULFATE 2.5; 2.5; 2.5; 2.5 MG/1; MG/1; MG/1; MG/1
10 CAPSULE, EXTENDED RELEASE ORAL DAILY
Qty: 30 CAPSULE | Refills: 0 | Status: SHIPPED | OUTPATIENT
Start: 2018-12-07 | End: 2019-01-07 | Stop reason: SDUPTHER

## 2018-10-08 RX ORDER — DEXTROAMPHETAMINE SACCHARATE, AMPHETAMINE ASPARTATE MONOHYDRATE, DEXTROAMPHETAMINE SULFATE AND AMPHETAMINE SULFATE 2.5; 2.5; 2.5; 2.5 MG/1; MG/1; MG/1; MG/1
10 CAPSULE, EXTENDED RELEASE ORAL DAILY
Qty: 30 CAPSULE | Refills: 0 | Status: SHIPPED | OUTPATIENT
Start: 2018-11-07 | End: 2019-01-07 | Stop reason: SDUPTHER

## 2018-10-08 RX ORDER — DEXTROAMPHETAMINE SACCHARATE, AMPHETAMINE ASPARTATE MONOHYDRATE, DEXTROAMPHETAMINE SULFATE AND AMPHETAMINE SULFATE 2.5; 2.5; 2.5; 2.5 MG/1; MG/1; MG/1; MG/1
10 CAPSULE, EXTENDED RELEASE ORAL DAILY
Qty: 30 CAPSULE | Refills: 0 | Status: SHIPPED | OUTPATIENT
Start: 2018-10-08 | End: 2019-01-07 | Stop reason: SDUPTHER

## 2018-10-08 RX ORDER — DEXTROAMPHETAMINE SACCHARATE, AMPHETAMINE ASPARTATE, DEXTROAMPHETAMINE SULFATE AND AMPHETAMINE SULFATE 1.25; 1.25; 1.25; 1.25 MG/1; MG/1; MG/1; MG/1
5-10 TABLET ORAL DAILY
Qty: 60 TABLET | Refills: 0 | Status: SHIPPED | OUTPATIENT
Start: 2018-11-07 | End: 2019-05-10

## 2018-10-08 RX ORDER — CLINDAMYCIN PHOSPHATE 10 MG/G
GEL TOPICAL
Qty: 90 G | Refills: 1 | Status: SHIPPED | OUTPATIENT
Start: 2018-10-08 | End: 2020-01-07 | Stop reason: SDUPTHER

## 2018-10-08 RX ORDER — DEXTROAMPHETAMINE SACCHARATE, AMPHETAMINE ASPARTATE, DEXTROAMPHETAMINE SULFATE AND AMPHETAMINE SULFATE 1.25; 1.25; 1.25; 1.25 MG/1; MG/1; MG/1; MG/1
5-10 TABLET ORAL DAILY
Qty: 60 TABLET | Refills: 0 | Status: SHIPPED | OUTPATIENT
Start: 2018-12-07 | End: 2019-05-10

## 2018-10-08 RX ORDER — SPIRONOLACTONE 25 MG/1
TABLET ORAL
Qty: 180 TABLET | Refills: 1 | Status: SHIPPED | OUTPATIENT
Start: 2018-10-08 | End: 2019-08-14 | Stop reason: SDUPTHER

## 2018-10-08 NOTE — PROGRESS NOTES
daily for 30 days. . 60 tablet 0    amphetamine-dextroamphetamine (ADDERALL, 5MG,) 5 MG tablet Take 1-2 tablets by mouth daily for 30 days. . 60 tablet 0    Chlorpheniramine-Phenylephrine (SINUS & ALLERGY PO) Take 2 tablets by mouth daily      LORATADINE PO Take 1 tablet by mouth as needed      Fexofenadine HCl (MUCINEX ALLERGY PO) Take 1 tablet by mouth daily as needed      spironolactone (ALDACTONE) 25 MG tablet TAKE 1 TABLET BY MOUTH TWO  TIMES DAILY 180 tablet 1    lamoTRIgine (LAMICTAL) 25 MG tablet TAKE 3 TABLETS BY MOUTH IN  THE MORNING AND 2 TABLETS  BY MOUTH AT BEDTIME (Patient taking differently: take 2 tabs po qd) 450 tablet 1    acyclovir (ZOVIRAX) 5 % ointment Apply topically every 3 hours 3 Tube 3    ACYCLOVIR PO Take by mouth      Glucosamine 500 MG CAPS Take by mouth      therapeutic multivitamin-minerals (THERAGRAN-M) tablet Take 1 tablet by mouth daily.  fluticasone (FLONASE) 50 MCG/ACT nasal spray 1 spray by Nasal route daily.           Allergies   Allergen Reactions    Macrodantin [Nitrofurantoin]     Sulfa Antibiotics Hives       Patient Active Problem List   Diagnosis    Irritable bowel syndrome    Esophageal reflux    Premenstrual tension syndrome    Migraine without aura    Allergic rhinitis    Asthma, exercise induced    ADD (attention deficit disorder)    Herpes simplex    TMJ arthropathy    Hip pain    Trochanteric bursitis of right hip    Chondromalacia of patellofemoral joint    Fatigue    Bipolar II disorder (HCC)    Acne vulgaris    Fibromyalgia       Past Medical History:   Diagnosis Date    Benign neoplasm of breast     Elevated liver enzymes 4/19/2013    Patellofemoral dysfunction 8/14/2012    Personal history of cervical dysplasia     Pilonidal cyst without mention of abscess     Sciatica of right side 11/28/2011    Urethral stricture unspecified        Past Surgical History:   Procedure Laterality Date    ARTHROPLASTY      temporomandibular

## 2018-12-03 ENCOUNTER — PATIENT MESSAGE (OUTPATIENT)
Dept: FAMILY MEDICINE CLINIC | Age: 53
End: 2018-12-03

## 2018-12-03 DIAGNOSIS — K21.9 GASTROESOPHAGEAL REFLUX DISEASE, ESOPHAGITIS PRESENCE NOT SPECIFIED: ICD-10-CM

## 2018-12-04 RX ORDER — LANSOPRAZOLE 30 MG/1
CAPSULE, DELAYED RELEASE ORAL
Qty: 180 CAPSULE | Refills: 0 | Status: SHIPPED | OUTPATIENT
Start: 2018-12-04 | End: 2019-01-27 | Stop reason: SDUPTHER

## 2019-01-07 ENCOUNTER — OFFICE VISIT (OUTPATIENT)
Dept: FAMILY MEDICINE CLINIC | Age: 54
End: 2019-01-07
Payer: COMMERCIAL

## 2019-01-07 VITALS
OXYGEN SATURATION: 99 % | SYSTOLIC BLOOD PRESSURE: 122 MMHG | HEART RATE: 77 BPM | WEIGHT: 236.4 LBS | TEMPERATURE: 97.1 F | RESPIRATION RATE: 12 BRPM | BODY MASS INDEX: 37.3 KG/M2 | DIASTOLIC BLOOD PRESSURE: 82 MMHG

## 2019-01-07 DIAGNOSIS — M21.42 PES PLANUS OF BOTH FEET: ICD-10-CM

## 2019-01-07 DIAGNOSIS — M21.41 PES PLANUS OF BOTH FEET: ICD-10-CM

## 2019-01-07 DIAGNOSIS — M79.7 FIBROMYALGIA: Primary | ICD-10-CM

## 2019-01-07 DIAGNOSIS — F90.2 ATTENTION DEFICIT HYPERACTIVITY DISORDER (ADHD), COMBINED TYPE: ICD-10-CM

## 2019-01-07 PROCEDURE — G8482 FLU IMMUNIZE ORDER/ADMIN: HCPCS | Performed by: FAMILY MEDICINE

## 2019-01-07 PROCEDURE — G8427 DOCREV CUR MEDS BY ELIG CLIN: HCPCS | Performed by: FAMILY MEDICINE

## 2019-01-07 PROCEDURE — 99214 OFFICE O/P EST MOD 30 MIN: CPT | Performed by: FAMILY MEDICINE

## 2019-01-07 PROCEDURE — G8417 CALC BMI ABV UP PARAM F/U: HCPCS | Performed by: FAMILY MEDICINE

## 2019-01-07 PROCEDURE — 3017F COLORECTAL CA SCREEN DOC REV: CPT | Performed by: FAMILY MEDICINE

## 2019-01-07 PROCEDURE — 1036F TOBACCO NON-USER: CPT | Performed by: FAMILY MEDICINE

## 2019-01-07 RX ORDER — DEXTROAMPHETAMINE SACCHARATE, AMPHETAMINE ASPARTATE MONOHYDRATE, DEXTROAMPHETAMINE SULFATE AND AMPHETAMINE SULFATE 2.5; 2.5; 2.5; 2.5 MG/1; MG/1; MG/1; MG/1
10 CAPSULE, EXTENDED RELEASE ORAL DAILY
Qty: 30 CAPSULE | Refills: 0 | Status: SHIPPED | OUTPATIENT
Start: 2019-03-08 | End: 2019-05-10

## 2019-01-07 RX ORDER — DEXTROAMPHETAMINE SACCHARATE, AMPHETAMINE ASPARTATE MONOHYDRATE, DEXTROAMPHETAMINE SULFATE AND AMPHETAMINE SULFATE 2.5; 2.5; 2.5; 2.5 MG/1; MG/1; MG/1; MG/1
10 CAPSULE, EXTENDED RELEASE ORAL DAILY
Qty: 30 CAPSULE | Refills: 0 | Status: SHIPPED | OUTPATIENT
Start: 2019-02-06 | End: 2019-05-10

## 2019-01-07 RX ORDER — DEXTROAMPHETAMINE SACCHARATE, AMPHETAMINE ASPARTATE MONOHYDRATE, DEXTROAMPHETAMINE SULFATE AND AMPHETAMINE SULFATE 2.5; 2.5; 2.5; 2.5 MG/1; MG/1; MG/1; MG/1
10 CAPSULE, EXTENDED RELEASE ORAL DAILY
Qty: 30 CAPSULE | Refills: 0 | Status: SHIPPED | OUTPATIENT
Start: 2019-01-07 | End: 2019-05-10

## 2019-01-27 ENCOUNTER — PATIENT MESSAGE (OUTPATIENT)
Dept: FAMILY MEDICINE CLINIC | Age: 54
End: 2019-01-27

## 2019-01-27 DIAGNOSIS — K21.9 GASTROESOPHAGEAL REFLUX DISEASE, ESOPHAGITIS PRESENCE NOT SPECIFIED: ICD-10-CM

## 2019-01-27 RX ORDER — LANSOPRAZOLE 30 MG/1
CAPSULE, DELAYED RELEASE ORAL
Qty: 180 CAPSULE | Refills: 1 | Status: SHIPPED | OUTPATIENT
Start: 2019-01-27 | End: 2019-08-14 | Stop reason: SDUPTHER

## 2019-02-18 ENCOUNTER — TELEPHONE (OUTPATIENT)
Dept: FAMILY MEDICINE CLINIC | Age: 54
End: 2019-02-18

## 2019-06-06 ENCOUNTER — OFFICE VISIT (OUTPATIENT)
Dept: FAMILY MEDICINE CLINIC | Age: 54
End: 2019-06-06
Payer: COMMERCIAL

## 2019-06-06 VITALS
BODY MASS INDEX: 38.34 KG/M2 | HEART RATE: 74 BPM | DIASTOLIC BLOOD PRESSURE: 69 MMHG | OXYGEN SATURATION: 97 % | TEMPERATURE: 95.9 F | SYSTOLIC BLOOD PRESSURE: 133 MMHG | WEIGHT: 243 LBS

## 2019-06-06 DIAGNOSIS — R53.83 FATIGUE, UNSPECIFIED TYPE: ICD-10-CM

## 2019-06-06 DIAGNOSIS — Z72.820 SLEEP DEPRIVATION: ICD-10-CM

## 2019-06-06 DIAGNOSIS — M79.7 FIBROMYALGIA: Primary | ICD-10-CM

## 2019-06-06 PROCEDURE — 99214 OFFICE O/P EST MOD 30 MIN: CPT | Performed by: FAMILY MEDICINE

## 2019-06-06 NOTE — PATIENT INSTRUCTIONS
Patient Education        Learning About Sleeping Well  What does sleeping well mean? Sleeping well means getting enough sleep. How much sleep is enough varies among people. The number of hours you sleep is not as important as how you feel when you wake up. If you do not feel refreshed, you probably need more sleep. Another sign of not getting enough sleep is feeling tired during the day. The average total nightly sleep time is 7½ to 8 hours. Healthy adults may need a little more or a little less than this. Why is getting enough sleep important? Getting enough quality sleep is a basic part of good health. When your sleep suffers, your mood and your thoughts can suffer too. You may find yourself feeling more grumpy or stressed. Not getting enough sleep also can lead to serious problems, including injury, accidents, anxiety, and depression. What might cause poor sleeping? Many things can cause sleep problems, including:  · Stress. Stress can be caused by fear about a single event, such as giving a speech. Or you may have ongoing stress, such as worry about work or school. · Depression, anxiety, and other mental or emotional conditions. · Changes in your sleep habits or surroundings. This includes changes that happen where you sleep, such as noise, light, or sleeping in a different bed. It also includes changes in your sleep pattern, such as having jet lag or working a late shift. · Health problems, such as pain, breathing problems, and restless legs syndrome. · Lack of regular exercise. How can you help yourself? Here are some tips that may help you sleep more soundly and wake up feeling more refreshed. Your sleeping area  · Use your bedroom only for sleeping and sex. A bit of light reading may help you fall asleep. But if it doesn't, do your reading elsewhere in the house. Don't watch TV in bed.   · Be sure your bed is big enough to stretch out comfortably, especially if you have a sleep partner. · Keep your bedroom quiet, dark, and cool. Use curtains, blinds, or a sleep mask to block out light. To block out noise, use earplugs, soothing music, or a \"white noise\" machine. Your evening and bedtime routine  · Create a relaxing bedtime routine. You might want to take a warm shower or bath, listen to soothing music, or drink a cup of noncaffeinated tea. · Go to bed at the same time every night. And get up at the same time every morning, even if you feel tired. What to avoid  · Limit caffeine (coffee, tea, caffeinated sodas) during the day, and don't have any for at least 4 to 6 hours before bedtime. · Don't drink alcohol before bedtime. Alcohol can cause you to wake up more often during the night. · Don't smoke or use tobacco, especially in the evening. Nicotine can keep you awake. · Don't take naps during the day, especially close to bedtime. · Don't lie in bed awake for too long. If you can't fall asleep, or if you wake up in the middle of the night and can't get back to sleep within 15 minutes or so, get out of bed and go to another room until you feel sleepy. · Don't take medicine right before bed that may keep you awake or make you feel hyper or energized. Your doctor can tell you if your medicine may do this and if you can take it earlier in the day. If you can't sleep  · Imagine yourself in a peaceful, pleasant scene. Focus on the details and feelings of being in a place that is relaxing. · Get up and do a quiet or boring activity until you feel sleepy. · Don't drink any liquids after 6 p.m. if you wake up often because you have to go to the bathroom. Where can you learn more? Go to https://[employer]. org and sign in to your [employer] account. Enter K953 in the [employer] box to learn more about \"Learning About Sleeping Well. \"     If you do not have an account, please click on the \"Sign Up Now\" link.   Current as of: September 11, 2018  Content Version: 12.0  © 1613-5907 [employer]. Care instructions adapted under license by [facility] ([facility]). If you have questions about a medical condition or this instruction, always ask your healthcare professional. [address] any warranty or liability for your use of this information.

## 2019-06-06 NOTE — PROGRESS NOTES
Subjective:      Patient ID: Diego Pack 49 y.o. female is here for evaluation for fatigue      HPI    Yfn Hernandez has been late for work frequently in the past month because she wakes up feeling fatigue; has trouble getting motivated to get ready for work. She never followed up for a sleep study. She only sleeps for 5 to 6 hours. Does not go to bed on time. Feels hungry in the evening after dinner. She is not exercising and gradually is hiring people to do more work around the house. She watches TV in the evening. Falls asleep in her chair. No trouble falling to sleep when goes to bed. Wakes up twice in the night and has not trouble getting back to sleep. \"I can fall asleep anywhere\". She does not feel depressed. Fibro is not well controlled; a little bit of activity makes her achy. Does not have TV in the bedroom  She has to be at work at 5 and has a 45 minute commute. Should get up at 7 to get out of the house. She is still doing Pilates 1 to 2 times a week. Stopped Lamictal.  Thought is was making her tired. A little less fatigue since off it and no mood change. No irritability, mood swings, impulsivity. She is seeing chiro and ortho for back (Dr. Becky Guajardo). Was referred to pain management for [facility]; has appt in July. Outpatient Medications Marked as Taking for the 6/6/19 encounter (Office Visit) with Krystal Guillaume MD   Medication Sig Dispense Refill    lansoprazole (PREVACID) 30 MG delayed release capsule TAKE 1 CAPSULE BY MOUTH  DAILY 2 TIMES DAILY 180 capsule 1    valACYclovir (VALTREX) 500 MG tablet TAKE ONE TABLET BY MOUTH TWICE A DAY FOR 5 DAYS 10 tablet 12    spironolactone (ALDACTONE) 25 MG tablet TAKE 1 TABLET BY MOUTH TWO  TIMES DAILY 180 tablet 1    LORATADINE PO Take 1 tablet by mouth as needed      Glucosamine 500 MG CAPS Take by mouth      therapeutic multivitamin-minerals (THERAGRAN-M) tablet Take 1 tablet by mouth daily.         fluticasone (FLONASE) 50 MCG/ACT nasal spray 1 spray by Nasal route daily.           Allergies   Allergen Reactions    Macrodantin [Nitrofurantoin]     Sulfa Antibiotics Hives       Patient Active Problem List   Diagnosis    Irritable bowel syndrome    Esophageal reflux    Premenstrual tension syndrome    Migraine without aura    Allergic rhinitis    Asthma, exercise induced    ADD (attention deficit disorder)    Herpes simplex    TMJ arthropathy    Hip pain    Trochanteric bursitis of right hip    Chondromalacia of patellofemoral joint    Fatigue    Bipolar II disorder (HCC)    Acne vulgaris    Fibromyalgia       Past Medical History:   Diagnosis Date    Benign neoplasm of breast     Elevated liver enzymes 4/19/2013    Patellofemoral dysfunction 8/14/2012    Personal history of cervical dysplasia     Pilonidal cyst without mention of abscess     Sciatica of right side 11/28/2011    Urethral stricture unspecified        Past Surgical History:   Procedure Laterality Date    ARTHROPLASTY      temporomandibular joint    ENDOMETRIAL ABLATION  2/9938    Dr. Stefano LEAL      cervical conization    NASAL SEPTUM SURGERY      POLYPECTOMY      nasal endoscopy    SINUS SURGERY  03/2017    Endoscopic sinus surgery        Family History   Problem Relation Age of Onset    Cancer Mother         skin cancer    Other Father         PVD    Other Brother         a&w    Hypertension Paternal Grandmother     Other Paternal Grandfather         myeloma       Social History     Tobacco Use    Smoking status: Never Smoker    Smokeless tobacco: Never Used   Substance Use Topics    Alcohol use: No    Drug use: No            Review of Systems  Review of Systems  Lab Results   Component Value Date     01/26/2018    K 4.6 01/26/2018     01/26/2018    CO2 26 01/26/2018    BUN 10 01/26/2018    CREATININE 0.6 01/26/2018    GLUCOSE 94 01/26/2018    CALCIUM 9.7 01/26/2018    PROT 7.1 01/26/2018    LABALBU 4.6 01/26/2018 BILITOT 1.3 (H) 01/26/2018    ALKPHOS 75 01/26/2018    AST 24 01/26/2018    ALT 32 01/26/2018    LABGLOM >60 01/26/2018    GFRAA >60 01/26/2018    AGRATIO 1.8 01/26/2018    GLOB 2.5 01/26/2018        TSH   Date Value Ref Range Status   01/26/2018 1.17 0.27 - 4.20 uIU/mL Final   12/22/2016 1.09 0.27 - 4.20 uIU/mL Final   07/01/2015 1.22 0.27 - 4.20 uIU/mL Final   04/19/2013 1.57 0.35 - 5.5 uIU/ml Final   08/14/2012 1.04 0.35 - 5.5 uIU/ml Final   08/23/2011 1.22 0.35 - 5.5 uIU/ml Final      Objective:   Physical Exam  Vitals:    06/06/19 1208   BP: 133/69   Pulse: 74   Temp: 95.9 °F (35.5 °C)   TempSrc: Oral   SpO2: 97%   Weight: 243 lb (110.2 kg)       Physical Exam  NAD  Skin is warm and dry. Mood and affect are normal.  No agitation or psychomotor retardation. No pressured speech, grandiosity or tangential thoughts. Insight and judgement are intact. Not suicidal.   The neck is supple and free of adenopathy or masses, the thyroid is normal without enlargement or nodules. Chest is clear, no wheezing or rales. Normal symmetric air entry throughout both lung fields. Heart regular with normal rate, no murmer or gallop     Mildly + trigger pt tenderness C 2, 6, traps, chest wall. Assessment:       Diagnosis Orders   1. Fibromyalgia     2. Fatigue, unspecified type     3. Sleep deprivation            Plan:      Importance of sleep for short and long term health addressed. She agrees to go to bed at 10, 30 minute wind down before bed, 10 minute meditation. If still has fatigue after 6 weeks will refer for sleep study. Try to increase Pilates to 2 to 3 times a week.

## 2019-06-10 ENCOUNTER — TELEPHONE (OUTPATIENT)
Dept: FAMILY MEDICINE CLINIC | Age: 54
End: 2019-06-10

## 2019-07-17 ENCOUNTER — OFFICE VISIT (OUTPATIENT)
Dept: FAMILY MEDICINE CLINIC | Age: 54
End: 2019-07-17
Payer: COMMERCIAL

## 2019-07-17 VITALS
RESPIRATION RATE: 12 BRPM | BODY MASS INDEX: 38.19 KG/M2 | SYSTOLIC BLOOD PRESSURE: 130 MMHG | WEIGHT: 242 LBS | OXYGEN SATURATION: 97 % | TEMPERATURE: 96.7 F | DIASTOLIC BLOOD PRESSURE: 72 MMHG | HEART RATE: 79 BPM

## 2019-07-17 DIAGNOSIS — M79.672 LEFT FOOT PAIN: ICD-10-CM

## 2019-07-17 DIAGNOSIS — I83.92 ASYMPTOMATIC VARICOSE VEINS OF LEFT LOWER EXTREMITY: Primary | ICD-10-CM

## 2019-07-17 DIAGNOSIS — G56.03 BILATERAL CARPAL TUNNEL SYNDROME: ICD-10-CM

## 2019-07-17 PROCEDURE — 99214 OFFICE O/P EST MOD 30 MIN: CPT | Performed by: FAMILY MEDICINE

## 2019-08-06 ENCOUNTER — PATIENT MESSAGE (OUTPATIENT)
Dept: FAMILY MEDICINE CLINIC | Age: 54
End: 2019-08-06

## 2019-08-13 ENCOUNTER — NURSE ONLY (OUTPATIENT)
Dept: FAMILY MEDICINE CLINIC | Age: 54
End: 2019-08-13
Payer: COMMERCIAL

## 2019-08-13 DIAGNOSIS — Z23 NEED FOR ZOSTER VACCINATION: Primary | ICD-10-CM

## 2019-08-13 PROCEDURE — 90471 IMMUNIZATION ADMIN: CPT | Performed by: FAMILY MEDICINE

## 2019-08-13 PROCEDURE — 90750 HZV VACC RECOMBINANT IM: CPT | Performed by: FAMILY MEDICINE

## 2019-08-14 DIAGNOSIS — K21.9 GASTROESOPHAGEAL REFLUX DISEASE, ESOPHAGITIS PRESENCE NOT SPECIFIED: ICD-10-CM

## 2019-08-14 DIAGNOSIS — Z00.00 WELL ADULT EXAM: Primary | ICD-10-CM

## 2019-08-14 DIAGNOSIS — L70.0 ACNE VULGARIS: ICD-10-CM

## 2019-08-14 RX ORDER — LANSOPRAZOLE 30 MG/1
CAPSULE, DELAYED RELEASE ORAL
Qty: 180 CAPSULE | Refills: 3 | Status: SHIPPED | OUTPATIENT
Start: 2019-08-14 | End: 2020-07-13 | Stop reason: SDUPTHER

## 2019-08-14 RX ORDER — SPIRONOLACTONE 25 MG/1
TABLET ORAL
Qty: 180 TABLET | Refills: 0 | Status: SHIPPED | OUTPATIENT
Start: 2019-08-14 | End: 2020-02-19

## 2019-10-16 ENCOUNTER — OFFICE VISIT (OUTPATIENT)
Dept: FAMILY MEDICINE CLINIC | Age: 54
End: 2019-10-16
Payer: COMMERCIAL

## 2019-10-16 VITALS
RESPIRATION RATE: 12 BRPM | DIASTOLIC BLOOD PRESSURE: 64 MMHG | BODY MASS INDEX: 37.87 KG/M2 | WEIGHT: 240 LBS | HEART RATE: 74 BPM | TEMPERATURE: 96.7 F | OXYGEN SATURATION: 98 % | SYSTOLIC BLOOD PRESSURE: 141 MMHG

## 2019-10-16 DIAGNOSIS — L70.0 ACNE VULGARIS: ICD-10-CM

## 2019-10-16 DIAGNOSIS — M26.629 TMJ SYNDROME: ICD-10-CM

## 2019-10-16 DIAGNOSIS — J06.9 UPPER RESPIRATORY TRACT INFECTION, UNSPECIFIED TYPE: Primary | ICD-10-CM

## 2019-10-16 DIAGNOSIS — J30.9 ALLERGIC RHINITIS, UNSPECIFIED SEASONALITY, UNSPECIFIED TRIGGER: ICD-10-CM

## 2019-10-16 PROCEDURE — 99214 OFFICE O/P EST MOD 30 MIN: CPT | Performed by: FAMILY MEDICINE

## 2019-10-16 RX ORDER — SPIRONOLACTONE 25 MG/1
TABLET ORAL
Qty: 180 TABLET | Refills: 0 | Status: CANCELLED | OUTPATIENT
Start: 2019-10-16

## 2019-10-16 RX ORDER — FLUTICASONE PROPIONATE 50 MCG
1 SPRAY, SUSPENSION (ML) NASAL DAILY
Qty: 3 BOTTLE | Refills: 3 | Status: SHIPPED | OUTPATIENT
Start: 2019-10-16

## 2019-10-16 RX ORDER — DOXYCYCLINE HYCLATE 100 MG
100 TABLET ORAL 2 TIMES DAILY
Qty: 20 TABLET | Refills: 0 | Status: SHIPPED | OUTPATIENT
Start: 2019-10-16 | End: 2019-10-26

## 2019-10-28 ENCOUNTER — OFFICE VISIT (OUTPATIENT)
Dept: FAMILY MEDICINE CLINIC | Age: 54
End: 2019-10-28
Payer: COMMERCIAL

## 2019-10-28 VITALS
OXYGEN SATURATION: 98 % | HEART RATE: 80 BPM | DIASTOLIC BLOOD PRESSURE: 72 MMHG | BODY MASS INDEX: 38.53 KG/M2 | RESPIRATION RATE: 12 BRPM | SYSTOLIC BLOOD PRESSURE: 135 MMHG | WEIGHT: 244.2 LBS | TEMPERATURE: 97.2 F

## 2019-10-28 DIAGNOSIS — Z23 NEED FOR INFLUENZA VACCINATION: ICD-10-CM

## 2019-10-28 DIAGNOSIS — F31.81 BIPOLAR II DISORDER (HCC): ICD-10-CM

## 2019-10-28 DIAGNOSIS — M79.7 FIBROMYALGIA: Primary | ICD-10-CM

## 2019-10-28 DIAGNOSIS — Z23 NEED FOR SHINGLES VACCINE: ICD-10-CM

## 2019-10-28 PROCEDURE — 90471 IMMUNIZATION ADMIN: CPT | Performed by: FAMILY MEDICINE

## 2019-10-28 PROCEDURE — 90472 IMMUNIZATION ADMIN EACH ADD: CPT | Performed by: FAMILY MEDICINE

## 2019-10-28 PROCEDURE — 90686 IIV4 VACC NO PRSV 0.5 ML IM: CPT | Performed by: FAMILY MEDICINE

## 2019-10-28 PROCEDURE — 99214 OFFICE O/P EST MOD 30 MIN: CPT | Performed by: FAMILY MEDICINE

## 2019-10-28 PROCEDURE — 90750 HZV VACC RECOMBINANT IM: CPT | Performed by: FAMILY MEDICINE

## 2019-11-15 DIAGNOSIS — Z00.00 WELL ADULT EXAM: ICD-10-CM

## 2019-11-15 DIAGNOSIS — R74.8 ELEVATED LIVER ENZYMES: Primary | ICD-10-CM

## 2019-11-15 LAB
A/G RATIO: 2.3 (ref 1.1–2.2)
ALBUMIN SERPL-MCNC: 5.4 G/DL (ref 3.4–5)
ALP BLD-CCNC: 80 U/L (ref 40–129)
ALT SERPL-CCNC: 64 U/L (ref 10–40)
ANION GAP SERPL CALCULATED.3IONS-SCNC: 15 MMOL/L (ref 3–16)
AST SERPL-CCNC: 45 U/L (ref 15–37)
BILIRUB SERPL-MCNC: 1.4 MG/DL (ref 0–1)
BUN BLDV-MCNC: 12 MG/DL (ref 7–20)
CALCIUM SERPL-MCNC: 10.1 MG/DL (ref 8.3–10.6)
CHLORIDE BLD-SCNC: 100 MMOL/L (ref 99–110)
CHOLESTEROL, TOTAL: 161 MG/DL (ref 0–199)
CO2: 24 MMOL/L (ref 21–32)
CREAT SERPL-MCNC: 0.7 MG/DL (ref 0.6–1.1)
GFR AFRICAN AMERICAN: >60
GFR NON-AFRICAN AMERICAN: >60
GLOBULIN: 2.4 G/DL
GLUCOSE BLD-MCNC: 103 MG/DL (ref 70–99)
HCT VFR BLD CALC: 45 % (ref 36–48)
HDLC SERPL-MCNC: 67 MG/DL (ref 40–60)
HEMOGLOBIN: 14.7 G/DL (ref 12–16)
LDL CHOLESTEROL CALCULATED: 73 MG/DL
MCH RBC QN AUTO: 26.7 PG (ref 26–34)
MCHC RBC AUTO-ENTMCNC: 32.8 G/DL (ref 31–36)
MCV RBC AUTO: 81.3 FL (ref 80–100)
PDW BLD-RTO: 13.6 % (ref 12.4–15.4)
PLATELET # BLD: 315 K/UL (ref 135–450)
PMV BLD AUTO: 8.9 FL (ref 5–10.5)
POTASSIUM SERPL-SCNC: 5.1 MMOL/L (ref 3.5–5.1)
RBC # BLD: 5.53 M/UL (ref 4–5.2)
SODIUM BLD-SCNC: 139 MMOL/L (ref 136–145)
TOTAL PROTEIN: 7.8 G/DL (ref 6.4–8.2)
TRIGL SERPL-MCNC: 107 MG/DL (ref 0–150)
TSH REFLEX: 1.14 UIU/ML (ref 0.27–4.2)
VLDLC SERPL CALC-MCNC: 21 MG/DL
WBC # BLD: 6.9 K/UL (ref 4–11)

## 2019-12-05 ENCOUNTER — TELEPHONE (OUTPATIENT)
Dept: FAMILY MEDICINE CLINIC | Age: 54
End: 2019-12-05

## 2019-12-05 RX ORDER — VILAZODONE HYDROCHLORIDE 10 MG/1
10 TABLET ORAL DAILY
Qty: 30 TABLET | Refills: 2 | Status: SHIPPED | OUTPATIENT
Start: 2019-12-05 | End: 2020-03-24 | Stop reason: SDUPTHER

## 2020-01-06 ENCOUNTER — TELEPHONE (OUTPATIENT)
Dept: FAMILY MEDICINE CLINIC | Age: 55
End: 2020-01-06

## 2020-01-07 ENCOUNTER — OFFICE VISIT (OUTPATIENT)
Dept: FAMILY MEDICINE CLINIC | Age: 55
End: 2020-01-07
Payer: COMMERCIAL

## 2020-01-07 VITALS
OXYGEN SATURATION: 96 % | RESPIRATION RATE: 12 BRPM | WEIGHT: 241.2 LBS | BODY MASS INDEX: 38.06 KG/M2 | SYSTOLIC BLOOD PRESSURE: 138 MMHG | HEART RATE: 82 BPM | TEMPERATURE: 95.1 F | DIASTOLIC BLOOD PRESSURE: 72 MMHG

## 2020-01-07 DIAGNOSIS — R74.8 ELEVATED LIVER ENZYMES: ICD-10-CM

## 2020-01-07 LAB
ALBUMIN SERPL-MCNC: 4.4 G/DL (ref 3.4–5)
ALP BLD-CCNC: 81 U/L (ref 40–129)
ALT SERPL-CCNC: 59 U/L (ref 10–40)
AST SERPL-CCNC: 28 U/L (ref 15–37)
BILIRUB SERPL-MCNC: 0.9 MG/DL (ref 0–1)
BILIRUBIN DIRECT: <0.2 MG/DL (ref 0–0.3)
BILIRUBIN, INDIRECT: ABNORMAL MG/DL (ref 0–1)
TOTAL PROTEIN: 7.2 G/DL (ref 6.4–8.2)

## 2020-01-07 PROCEDURE — 99214 OFFICE O/P EST MOD 30 MIN: CPT | Performed by: FAMILY MEDICINE

## 2020-01-07 RX ORDER — CLINDAMYCIN PHOSPHATE 10 MG/G
GEL TOPICAL
Qty: 90 G | Refills: 1 | Status: SHIPPED | OUTPATIENT
Start: 2020-01-07 | End: 2020-01-14

## 2020-01-07 RX ORDER — AZITHROMYCIN 250 MG/1
250 TABLET, FILM COATED ORAL SEE ADMIN INSTRUCTIONS
Qty: 6 TABLET | Refills: 1 | Status: SHIPPED | OUTPATIENT
Start: 2020-01-07 | End: 2020-01-12

## 2020-01-07 RX ORDER — GUAIFENESIN AND CODEINE PHOSPHATE 100; 10 MG/5ML; MG/5ML
5 SOLUTION ORAL 2 TIMES DAILY PRN
Qty: 50 ML | Refills: 0 | Status: SHIPPED | OUTPATIENT
Start: 2020-01-07 | End: 2020-01-12

## 2020-01-07 NOTE — PROGRESS NOTES
MOUTH TWICE A DAY FOR 5 DAYS Yes Henry Pardo MD   acyclovir (ZOVIRAX) 5 % ointment APPLY TOPICALLY EVERY 3  HOURS Yes Henry Pardo MD   LORATADINE PO Take 1 tablet by mouth as needed Yes Historical Provider, MD   Glucosamine 500 MG CAPS Take by mouth Yes Historical Provider, MD   therapeutic multivitamin-minerals (THERAGRAN-M) tablet Take 1 tablet by mouth daily.    Yes Historical Provider, MD          Vitals:    01/07/20 1132 01/07/20 1158   BP: (!) 168/88 138/72   Pulse: 82    Resp: 12    Temp: 95.1 °F (35.1 °C)    TempSrc: Oral    SpO2: 96%    Weight: 241 lb 3.2 oz (109.4 kg)       Wt Readings from Last 3 Encounters:   01/07/20 241 lb 3.2 oz (109.4 kg)   10/28/19 244 lb 3.2 oz (110.8 kg)   10/16/19 240 lb (108.9 kg)     BP Readings from Last 3 Encounters:   01/07/20 138/72   10/28/19 135/72   10/16/19 (!) 141/64       Patient Active Problem List   Diagnosis    Irritable bowel syndrome    Esophageal reflux    Premenstrual tension syndrome    Migraine without aura    Allergic rhinitis    Asthma, exercise induced    ADD (attention deficit disorder)    Herpes simplex    Bipolar II disorder (HCC)    Fibromyalgia       Immunization History   Administered Date(s) Administered    Influenza Virus Vaccine 10/18/2017, 10/10/2018    Influenza, Quadv, IM, PF (6 mo and older Fluzone, Flulaval, Fluarix, and 3 yrs and older Afluria) 09/30/2016, 10/10/2018, 10/28/2019    PPD Test 09/27/2011    Tdap (Boostrix, Adacel) 12/27/2006, 07/13/2018    Zoster Recombinant (Shingrix) 08/13/2019, 10/28/2019       Past Medical History:   Diagnosis Date    Benign neoplasm of breast     Chondromalacia of patellofemoral joint 9/2/2015    Elevated liver enzymes 4/19/2013    Patellofemoral dysfunction 8/14/2012    Personal history of cervical dysplasia     Pilonidal cyst without mention of abscess     Sciatica of right side 11/28/2011    TMJ arthropathy 3/14/2014    Trochanteric bursitis of right hip 9/2/2015    You can use the QVAR. 2) Start antibiotic. Please note a portion of this chart was generated using dragon dictation software. Although every effort was made to ensure the accuracy of this automated transcription,some errors in transcription may have occurred.

## 2020-01-24 ENCOUNTER — OFFICE VISIT (OUTPATIENT)
Dept: FAMILY MEDICINE CLINIC | Age: 55
End: 2020-01-24
Payer: COMMERCIAL

## 2020-01-24 VITALS
HEIGHT: 67 IN | WEIGHT: 246.5 LBS | DIASTOLIC BLOOD PRESSURE: 80 MMHG | BODY MASS INDEX: 38.69 KG/M2 | SYSTOLIC BLOOD PRESSURE: 128 MMHG | TEMPERATURE: 97.1 F | OXYGEN SATURATION: 98 % | HEART RATE: 87 BPM | RESPIRATION RATE: 10 BRPM

## 2020-01-24 PROBLEM — F41.8 DEPRESSION WITH ANXIETY: Status: ACTIVE | Noted: 2020-01-24

## 2020-01-24 PROCEDURE — 99214 OFFICE O/P EST MOD 30 MIN: CPT | Performed by: FAMILY MEDICINE

## 2020-01-24 PROCEDURE — 17110 DESTRUCTION B9 LES UP TO 14: CPT | Performed by: FAMILY MEDICINE

## 2020-01-24 PROCEDURE — 11200 RMVL SKIN TAGS UP TO&INC 15: CPT | Performed by: FAMILY MEDICINE

## 2020-01-24 NOTE — PROGRESS NOTES
Subjective:      Patient ID: Tony Carrero 49 y.o. female. is here for evaluation for finger soreness      HPI    Jennifer Bassett complains of a sore area in her left index finger for one year. Is sores if she hits it certain ways. Not getting better or worse. She thinks she got something in it. Rx: none. She also notes a skin tag under the right breast that keeps getting irritated; would like it removed. Cleopatra Johnny is the only thing that has ever helped her depression and anxiety but causes diarrhea. She started a diarrhea; helps some. Motivation is improved. Able to get out of bed and get to work. No appetite or sleep disturbance, no anhedonia, Not suicidal.      Outpatient Medications Marked as Taking for the 1/24/20 encounter (Office Visit) with Tamra Guillaume MD   Medication Sig Dispense Refill    vilazodone HCl (VILAZODONE HCL) 10 MG TABS Take 1 tablet by mouth daily 30 tablet 2    fluticasone (FLONASE) 50 MCG/ACT nasal spray 1 spray by Nasal route daily 3 Bottle 3    spironolactone (ALDACTONE) 25 MG tablet TAKE 1 TABLET BY MOUTH TWO  TIMES DAILY 180 tablet 0    lansoprazole (PREVACID) 30 MG delayed release capsule TAKE 1 CAPSULE BY MOUTH  DAILY 2 TIMES DAILY 180 capsule 3    valACYclovir (VALTREX) 500 MG tablet TAKE ONE TABLET BY MOUTH TWICE A DAY FOR 5 DAYS 10 tablet 12    LORATADINE PO Take 1 tablet by mouth as needed      Glucosamine 500 MG CAPS Take by mouth      therapeutic multivitamin-minerals (THERAGRAN-M) tablet Take 1 tablet by mouth daily.             Allergies   Allergen Reactions    Macrodantin [Nitrofurantoin]     Sulfa Antibiotics Hives       Patient Active Problem List   Diagnosis    Irritable bowel syndrome    Esophageal reflux    Premenstrual tension syndrome    Migraine without aura    Allergic rhinitis    Asthma, exercise induced    ADD (attention deficit disorder)    Herpes simplex    Bipolar II disorder (HCC)    Fibromyalgia       Past Medical History:

## 2020-02-04 ENCOUNTER — PATIENT MESSAGE (OUTPATIENT)
Dept: FAMILY MEDICINE CLINIC | Age: 55
End: 2020-02-04

## 2020-02-17 ENCOUNTER — OFFICE VISIT (OUTPATIENT)
Dept: FAMILY MEDICINE CLINIC | Age: 55
End: 2020-02-17
Payer: COMMERCIAL

## 2020-02-17 VITALS
DIASTOLIC BLOOD PRESSURE: 80 MMHG | OXYGEN SATURATION: 99 % | TEMPERATURE: 97.4 F | HEART RATE: 87 BPM | WEIGHT: 242 LBS | RESPIRATION RATE: 14 BRPM | BODY MASS INDEX: 38.19 KG/M2 | SYSTOLIC BLOOD PRESSURE: 122 MMHG

## 2020-02-17 PROCEDURE — 10120 INC&RMVL FB SUBQ TISS SMPL: CPT | Performed by: FAMILY MEDICINE

## 2020-02-17 PROCEDURE — 99213 OFFICE O/P EST LOW 20 MIN: CPT | Performed by: FAMILY MEDICINE

## 2020-02-17 RX ORDER — DOXYCYCLINE HYCLATE 100 MG/1
100 CAPSULE ORAL 2 TIMES DAILY
Qty: 20 CAPSULE | Refills: 0 | Status: SHIPPED | OUTPATIENT
Start: 2020-02-17 | End: 2020-02-27

## 2020-02-17 NOTE — PROGRESS NOTES
Subjective:      Patient ID: Morteza Palacios 49 y.o. female. The encounter diagnosis was Viral wart on finger. HPI    Suzan Palomares is here for treatment of wart on the left index finger. Was treated with liquid nitrogen 1/24/20. Is better but not gone. She thinks it is not a wart. She thinks it is a foreign body rxn. Has been present x one years. Suzan Palomares also complains of pressure in the left eye, scratching throat x one week. No nasal discharge. Mild photophobia and nausea. No phonophobia. Minimal wheezing for a few days but not the the past 2 days. Denies fever. Dry cough - better day. She started Z-jose yesterday and feels a bit better today. Mucinex is not helping. Outpatient Medications Marked as Taking for the 2/17/20 encounter (Office Visit) with Lencho David MD   Medication Sig Dispense Refill    vilazodone HCl (VILAZODONE HCL) 10 MG TABS Take 1 tablet by mouth daily 30 tablet 2    fluticasone (FLONASE) 50 MCG/ACT nasal spray 1 spray by Nasal route daily 3 Bottle 3    spironolactone (ALDACTONE) 25 MG tablet TAKE 1 TABLET BY MOUTH TWO  TIMES DAILY 180 tablet 0    lansoprazole (PREVACID) 30 MG delayed release capsule TAKE 1 CAPSULE BY MOUTH  DAILY 2 TIMES DAILY 180 capsule 3    valACYclovir (VALTREX) 500 MG tablet TAKE ONE TABLET BY MOUTH TWICE A DAY FOR 5 DAYS 10 tablet 12    acyclovir (ZOVIRAX) 5 % ointment APPLY TOPICALLY EVERY 3  HOURS 90 g 3    LORATADINE PO Take 1 tablet by mouth as needed      Glucosamine 500 MG CAPS Take by mouth      therapeutic multivitamin-minerals (THERAGRAN-M) tablet Take 1 tablet by mouth daily.             Allergies   Allergen Reactions    Macrodantin [Nitrofurantoin]     Sulfa Antibiotics Hives       Patient Active Problem List   Diagnosis    Irritable bowel syndrome    Esophageal reflux    Premenstrual tension syndrome    Migraine without aura    Allergic rhinitis    Asthma, exercise induced    ADD (attention deficit disorder)    Cardiac regular w/o murmer, gallop. Left index finger between DIP and PIP 2 mm nodule. Risk of infection and bleeding with exploration addressed. Area cleansed with alcohol pad, anesthetized with lidocaine plan 1 % plain. Incised with # 11 blade. Small core removed. No discrete fb identified. Sterile dressing placed. Tolerated well. Assessment:       Diagnosis Orders   1. Acute non-recurrent maxillary sinusitis  doxycycline hyclate (VIBRAMYCIN) 100 MG capsule   2. Foreign body of finger of left hand, initial encounter  [address] [place] [address] [place]  Wound care addressed  Monitor for infection           Plan:      Side effects of current medications reviewed and questions answered. Call or return to clinic prn if these symptoms worsen or fail to improve as anticipated.

## 2020-02-19 RX ORDER — SPIRONOLACTONE 25 MG/1
TABLET ORAL
Qty: 180 TABLET | Refills: 0 | Status: SHIPPED | OUTPATIENT
Start: 2020-02-19 | End: 2020-08-04

## 2020-06-22 ENCOUNTER — PATIENT MESSAGE (OUTPATIENT)
Dept: FAMILY MEDICINE CLINIC | Age: 55
End: 2020-06-22

## 2020-06-25 ENCOUNTER — PATIENT MESSAGE (OUTPATIENT)
Dept: FAMILY MEDICINE CLINIC | Age: 55
End: 2020-06-25

## 2020-06-25 DIAGNOSIS — R53.83 FATIGUE, UNSPECIFIED TYPE: ICD-10-CM

## 2020-06-25 NOTE — TELEPHONE ENCOUNTER
From: Dandre Sauceda  To: Darlene Kemp MD  Sent: 6/25/2020 8:18 AM EDT  Subject: Non-Urgent Medical Question    Is there a lab in [place] that I can go to? I should ask the other day because I'm going out of town on Monday and I have my granddaughter so I didn't know if I could go Thursday or Friday trying to go in the morning after you fast thank you      ----- Message -----   Miladis Berry MD    Sent:6/22/2020 5:55 PM EDT   To:Angel Luis Morales   Subject:RE: Non-Urgent Medical Question    I can order labs for you. You can use the lab in the building beside our current office. ( [facility] [address] The order will be there. If there is a more convenient lab for you let me know. Let me know if you have any questions or concerns. Take care. Dr. Sedrick Tabares         ----- Message -----   From:Angel Luis Morales   OLCT:3/51/9400 12:43 PM EDT   MD Karlo Campbell Dr.,. Just checking when my last blood work was completed. I'm feeling rather sluggish. I completely went off the viybrid a few months ago. I added some vitamin d to my multivitamin. I'm just tired a lot which affects my motivation. Just seeing if I was due for blood work to see what type of vitamin I might be missing.  Thank you Tamra Mary

## 2020-06-25 NOTE — TELEPHONE ENCOUNTER
From: Johan Joya  To: Bernard Zepeda MD  Sent: 6/25/2020 12:12 PM EDT  Subject: Non-Urgent Medical Question    Flakita Ardon      ----- Message -----   From:CHRISTIN Adarsh Schmitt   BQZB:8/59/8596 10:57 AM EDT   To:Angel Luis Paredes   Subject:RE: Non-Urgent Medical Question    Linnea Beverage, as far as mike Slaughter, [address] [facility], [address] [facility], [place], [address] [place] [address], will probably be the closest one to you. Thank you. CHRISTIN Jiang      ----- Message -----   From:Angel Luis Dewey 8:18 AM EDT   To:Ros Burton MD   Subject:Non-Urgent Medical Question    Is there a lab in [place] that I can go to? I should ask the other day because I'm going out of town on Monday and I have my granddaughter so I didn't know if I could go Thursday or Friday trying to go in the morning after you fast thank you      ----- Message -----   Dave Paredes MD   Sent:6/22/2020 5:55 PM EDT   To:Angel Luis Paredes   Subject:RE: Non-Urgent Medical Question    I can order labs for you. You can use the lab in the building beside our current office. ( [address], [address]). The order will be there. If there is a more convenient lab for you let me know. Let me know if you have any questions or concerns. Take care. Dr. Vik Sifuentes         ----- Message -----   From:Angel Luis Paredes   DTWB:1/08/3826 12:43 PM EDT   MD Tani Cochran Modest    Dr. Vik Sifuentes,. Just checking when my last blood work was completed. I'm feeling rather sluggish. I completely went off the viybrid a few months ago. I added some vitamin d to my multivitamin. I'm just tired a lot which affects my motivation. Just seeing if I was due for blood work to see what type of vitamin I might be missing.  Thank you Linnea Beverage

## 2020-06-26 LAB
A/G RATIO: 1.7 (ref 1.1–2.2)
ALBUMIN SERPL-MCNC: 4.4 G/DL (ref 3.4–5)
ALP BLD-CCNC: 77 U/L (ref 40–129)
ALT SERPL-CCNC: 30 U/L (ref 10–40)
ANION GAP SERPL CALCULATED.3IONS-SCNC: 15 MMOL/L (ref 3–16)
AST SERPL-CCNC: 22 U/L (ref 15–37)
BILIRUB SERPL-MCNC: 1.1 MG/DL (ref 0–1)
BUN BLDV-MCNC: 9 MG/DL (ref 7–20)
CALCIUM SERPL-MCNC: 9.3 MG/DL (ref 8.3–10.6)
CHLORIDE BLD-SCNC: 102 MMOL/L (ref 99–110)
CO2: 21 MMOL/L (ref 21–32)
CREAT SERPL-MCNC: 0.6 MG/DL (ref 0.6–1.1)
GFR AFRICAN AMERICAN: >60
GFR NON-AFRICAN AMERICAN: >60
GLOBULIN: 2.6 G/DL
GLUCOSE BLD-MCNC: 103 MG/DL (ref 70–99)
HCT VFR BLD CALC: 41.8 % (ref 36–48)
HEMOGLOBIN: 13.3 G/DL (ref 12–16)
MCH RBC QN AUTO: 25.9 PG (ref 26–34)
MCHC RBC AUTO-ENTMCNC: 31.9 G/DL (ref 31–36)
MCV RBC AUTO: 81.4 FL (ref 80–100)
PDW BLD-RTO: 13.8 % (ref 12.4–15.4)
PLATELET # BLD: 283 K/UL (ref 135–450)
PMV BLD AUTO: 9.1 FL (ref 5–10.5)
POTASSIUM SERPL-SCNC: 4.4 MMOL/L (ref 3.5–5.1)
RBC # BLD: 5.14 M/UL (ref 4–5.2)
SODIUM BLD-SCNC: 138 MMOL/L (ref 136–145)
TOTAL PROTEIN: 7 G/DL (ref 6.4–8.2)
TSH REFLEX: 1.35 UIU/ML (ref 0.27–4.2)
VITAMIN B-12: 592 PG/ML (ref 211–911)
WBC # BLD: 8.9 K/UL (ref 4–11)

## 2020-07-13 ENCOUNTER — OFFICE VISIT (OUTPATIENT)
Dept: FAMILY MEDICINE CLINIC | Age: 55
End: 2020-07-13
Payer: COMMERCIAL

## 2020-07-13 VITALS
RESPIRATION RATE: 16 BRPM | DIASTOLIC BLOOD PRESSURE: 80 MMHG | OXYGEN SATURATION: 99 % | BODY MASS INDEX: 40.05 KG/M2 | HEART RATE: 83 BPM | SYSTOLIC BLOOD PRESSURE: 124 MMHG | TEMPERATURE: 98.3 F | WEIGHT: 253.8 LBS

## 2020-07-13 PROCEDURE — 99214 OFFICE O/P EST MOD 30 MIN: CPT | Performed by: FAMILY MEDICINE

## 2020-07-13 RX ORDER — LANSOPRAZOLE 30 MG/1
CAPSULE, DELAYED RELEASE ORAL
Qty: 180 CAPSULE | Refills: 3 | Status: SHIPPED | OUTPATIENT
Start: 2020-07-13 | End: 2020-12-07 | Stop reason: SDUPTHER

## 2020-07-13 NOTE — PROGRESS NOTES
Subjective:      Patient ID: Herb Palmer 48 y.o. female. The encounter diagnosis was Pedal edema. HPI    Joo Mercer complains of worsening depression. She stopped taking the Viibryd for GI upset. The Viibryd did help but the GI upset and diarrhea were not worth it. She had Gene Sight testing and has seen psychiatry and has never found a medication that helps or that she can tolerate. She feels good when with her kids. When by herself she \"wants to crawl under a desk and stay there\". Is busy doing yard work, but no regular exercise. She sleeps well when she stays on a routine. She just started to see another counselor. Her boss notes she is not doing well at work. Missing work 61 - 79 of the time recently. She had a lot of vaccine and sick time she has been using. She is active    She complains of swelling in her feet for a few weeks. Started when she was in [address] [place] [address] visiting family. Eats out more when at the beach. This is a recurrent problem. She typically watches her salt. Takes Tylenol PRN, no NSAIDS. No PND, orthopnea, dyspnea, cough, chest pain. She takes Aldactone once a day. Never tried higher dose. Swelling is gone the last 2 days. She did have wheezing in [place], not since home. Insurance won't cover PPI - told her it was over-the-counter. The Prevacid 15 mg is not effective.   Has recurrent heartburn    Lab Results   Component Value Date     06/25/2020    K 4.4 06/25/2020     06/25/2020    CO2 21 06/25/2020    BUN 9 06/25/2020    CREATININE 0.6 06/25/2020    GLUCOSE 103 06/25/2020    CALCIUM 9.3 06/25/2020       Lab Results   Component Value Date    WBC 8.9 06/25/2020    HGB 13.3 06/25/2020    HCT 41.8 06/25/2020    MCV 81.4 06/25/2020     06/25/2020     TSH   Date Value Ref Range Status   06/25/2020 1.35 0.27 - 4.20 uIU/mL Final   11/15/2019 1.14 0.27 - 4.20 uIU/mL Final   01/26/2018 1.17 0.27 - 4.20 uIU/mL Final   04/19/2013 1.57 0.35 - 5.5 uIU/ml Final 08/14/2012 1.04 0.35 - 5.5 uIU/ml Final   08/23/2011 1.22 0.35 - 5.5 uIU/ml Final      No outpatient medications have been marked as taking for the 7/13/20 encounter (Appointment) with Lien Hobson MD.        Allergies   Allergen Reactions    Macrodantin [Nitrofurantoin]     Sulfa Antibiotics Hives       Patient Active Problem List   Diagnosis    Irritable bowel syndrome    Esophageal reflux    Premenstrual tension syndrome    Migraine without aura    Allergic rhinitis    Asthma, exercise induced    ADD (attention deficit disorder)    Herpes simplex    Fibromyalgia    Depression with anxiety       Past Medical History:   Diagnosis Date    Benign neoplasm of breast     Chondromalacia of patellofemoral joint 9/2/2015    Elevated liver enzymes 4/19/2013    Patellofemoral dysfunction 8/14/2012    Personal history of cervical dysplasia     Pilonidal cyst without mention of abscess     Sciatica of right side 11/28/2011    TMJ arthropathy 3/14/2014    Trochanteric bursitis of right hip 9/2/2015    Urethral stricture unspecified        Past Surgical History:   Procedure Laterality Date    ARTHROPLASTY      temporomandibular joint    ENDOMETRIAL ABLATION  5/5497    Dr. Renna Dance LEEP      cervical conization    NASAL SEPTUM SURGERY      POLYPECTOMY      nasal endoscopy    SINUS SURGERY  03/2017    Endoscopic sinus surgery        Family History   Problem Relation Age of Onset    Cancer Mother         skin cancer    Other Father         PVD    Other Brother         a&w    Hypertension Paternal Grandmother     Other Paternal Grandfather         myeloma       Social History     Tobacco Use    Smoking status: Never Smoker    Smokeless tobacco: Never Used   Substance Use Topics    Alcohol use: No    Drug use: No            Review of Systems  Review of Systems    Lab Results   Component Value Date     06/25/2020    K 4.4 06/25/2020     06/25/2020    CO2 21 06/25/2020    BUN 9 06/25/2020    CREATININE 0.6 06/25/2020    GLUCOSE 103 06/25/2020    CALCIUM 9.3 06/25/2020       TSH   Date Value Ref Range Status   06/25/2020 1.35 0.27 - 4.20 uIU/mL Final   11/15/2019 1.14 0.27 - 4.20 uIU/mL Final   01/26/2018 1.17 0.27 - 4.20 uIU/mL Final   04/19/2013 1.57 0.35 - 5.5 uIU/ml Final   08/14/2012 1.04 0.35 - 5.5 uIU/ml Final   08/23/2011 1.22 0.35 - 5.5 uIU/ml Final     Lab Results   Component Value Date    WBC 8.9 06/25/2020    HGB 13.3 06/25/2020    HCT 41.8 06/25/2020    MCV 81.4 06/25/2020     06/25/2020       Objective:   Physical Exam  Vitals:    07/13/20 1546 07/13/20 1557   BP: (!) 142/89 124/80   Pulse: 83    Resp: 16    Temp: 98.3 °F (36.8 °C)    TempSrc: Temporal    SpO2: 99%    Weight: 253 lb 12.8 oz (115.1 kg)      Wt Readings from Last 3 Encounters:   07/13/20 253 lb 12.8 oz (115.1 kg)   02/17/20 242 lb (109.8 kg)   01/24/20 246 lb 8 oz (111.8 kg)        Physical Exam  NAD  Skin is warm and dry. Mood and affect are mildly depressed. No agitation or psychomotor retardation. No pressured speech, grandiosity or tangential thoughts. Insight and judgement are intact. Not suicidal.   The neck is supple and free of adenopathy or masses, the thyroid is normal without enlargement or nodules. Chest is clear, no wheezing or rales. Normal symmetric air entry throughout both lung fields. Heart regular with normal rate, no murmer or gallop . No JVD or pedal edema. Aorta, femoral, DP and PT pulses intact. Assessment:       Diagnosis Orders   1. Pedal edema  Low salt diet, exercise, support hose. Can take extra Aldactone PRN    2. Gastroesophageal reflux disease, esophagitis presence not specified  lansoprazole (PREVACID) 30 MG delayed release capsule  Good Rx price discussed   3. Depression with anxiety  levomilnacipran (FETZIMA) 20 MG CP24 extended release capsule  Side effects of current medications reviewed and questions answered.    Continue with counseling          Plan: Side effects of current medications reviewed and questions answered.    MyChart follow up in 2 weeks; in person 4 to 6 weeks

## 2020-07-13 NOTE — PATIENT INSTRUCTIONS
Patient Education        Low Sodium Diet (2,000 Milligram): Care Instructions  Your Care Instructions     Too much sodium causes your body to hold on to extra water. This can raise your blood pressure and force your heart and kidneys to work harder. In very serious cases, this could cause you to be put in the hospital. It might even be life-threatening. By limiting sodium, you will feel better and lower your risk of serious problems. The most common source of sodium is salt. People get most of the salt in their diet from canned, prepared, and packaged foods. Fast food and restaurant meals also are very high in sodium. Your doctor will probably limit your sodium to less than 2,000 milligrams (mg) a day. This limit counts all the sodium in prepared and packaged foods and any salt you add to your food. Follow-up care is a key part of your treatment and safety. Be sure to make and go to all appointments, and call your doctor if you are having problems. It's also a good idea to know your test results and keep a list of the medicines you take. How can you care for yourself at home? Read food labels  · Read labels on cans and food packages. The labels tell you how much sodium is in each serving. Make sure that you look at the serving size. If you eat more than the serving size, you have eaten more sodium. · Food labels also tell you the Percent Daily Value for sodium. Choose products with low Percent Daily Values for sodium. · Be aware that sodium can come in forms other than salt, including monosodium glutamate (MSG), sodium citrate, and sodium bicarbonate (baking soda). MSG is often added to Asian food. When you eat out, you can sometimes ask for food without MSG or added salt. Buy low-sodium foods  · Buy foods that are labeled \"unsalted\" (no salt added), \"sodium-free\" (less than 5 mg of sodium per serving), or \"low-sodium\" (less than 140 mg of sodium per serving).  Foods labeled \"reduced-sodium\" and \"light sodium\" may still have too much sodium. Be sure to read the label to see how much sodium you are getting. · Buy fresh vegetables, or frozen vegetables without added sauces. Buy low-sodium versions of canned vegetables, soups, and other canned goods. Prepare low-sodium meals  · Cut back on the amount of salt you use in cooking. This will help you adjust to the taste. Do not add salt after cooking. One teaspoon of salt has about 2,300 mg of sodium. · Take the salt shaker off the table. · Flavor your food with garlic, lemon juice, onion, vinegar, herbs, and spices. Do not use soy sauce, lite soy sauce, steak sauce, onion salt, garlic salt, celery salt, mustard, or ketchup on your food. · Use low-sodium salad dressings, sauces, and ketchup. Or make your own salad dressings and sauces without adding salt. · Use less salt (or none) when recipes call for it. You can often use half the salt a recipe calls for without losing flavor. Other foods such as rice, pasta, and grains do not need added salt. · Rinse canned vegetables, and cook them in fresh water. This removes some--but not all--of the salt. · Avoid water that is naturally high in sodium or that has been treated with water softeners, which add sodium. Call your local water company to find out the sodium content of your water supply. If you buy bottled water, read the label and choose a sodium-free brand. Avoid high-sodium foods  · Avoid eating:  ? Smoked, cured, salted, and canned meat, fish, and poultry. ? Ham, smith, hot dogs, and luncheon meats. ? Regular, hard, and processed cheese and regular peanut butter. ? Crackers with salted tops, and other salted snack foods such as pretzels, chips, and salted popcorn. ? Frozen prepared meals, unless labeled low-sodium. ? Canned and dried soups, broths, and bouillon, unless labeled sodium-free or low-sodium. ? Canned vegetables, unless labeled sodium-free or low-sodium. ?  [place] fries, pizza, tacos, and other fast foods.  ? Pickles, olives, ketchup, and other condiments, especially soy sauce, unless labeled sodium-free or low-sodium. Where can you learn more? Go to https://[email or web address] org and sign in to your [employer] account. Enter K612 in the [facility] box to learn more about \"Low Sodium Diet (2,000 Milligram): Care Instructions. \"     If you do not have an account, please click on the \"Sign Up Now\" link. Current as of: August 22, 2019               Content Version: 12.5  © 1838-7501 [employer]wise, Incorporated. Care instructions adapted under license by [facility] ([facility]). If you have questions about a medical condition or this instruction, always ask your healthcare professional. [address] any warranty or liability for your use of this information.

## 2020-08-10 ENCOUNTER — PATIENT MESSAGE (OUTPATIENT)
Dept: FAMILY MEDICINE CLINIC | Age: 55
End: 2020-08-10

## 2020-08-11 NOTE — TELEPHONE ENCOUNTER
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 I started the fetzima on July 31st. I've noticed some improvement. Starting the plateau. Really struggling with working and functioning day to day. Weekends and around people seem to do better some of time. A lot of uncertainty going on. Personnel says that taking people off for medical is difficult so I'll probably have to go out for depression through mental health. I'll keep you posted. I guess I need to raise the dose to see if that helps. Can you please call in the next dose to [facility].   Thank you Joo Camejo

## 2020-08-26 ENCOUNTER — PATIENT MESSAGE (OUTPATIENT)
Dept: FAMILY MEDICINE CLINIC | Age: 55
End: 2020-08-26

## 2020-08-27 NOTE — TELEPHONE ENCOUNTER
ARLETTE Gifford, I've taken the 20 mg two days in a row and I'm just tired I think because of the drop. I almost want to take the 20 tonight and just see how I sleep. I don't know if it'll keep me up or the opposite. I like the 40 in the daytime but I was getting tired in the sweating. I get some of that with the 20 but I do feel like my mental state was better on the 40 That's why I'm trying to weigh it out. Thanks for brainstorming I'll let you know because I might need more 20 if I decide to use the 20.   Thank you Leslie Brown

## 2020-08-31 ENCOUNTER — APPOINTMENT (RX ONLY)
Dept: URBAN - METROPOLITAN AREA CLINIC 170 | Facility: CLINIC | Age: 55
Setting detail: DERMATOLOGY
End: 2020-08-31

## 2020-08-31 DIAGNOSIS — D18.0 HEMANGIOMA: ICD-10-CM

## 2020-08-31 DIAGNOSIS — L81.4 OTHER MELANIN HYPERPIGMENTATION: ICD-10-CM

## 2020-08-31 DIAGNOSIS — L82.1 OTHER SEBORRHEIC KERATOSIS: ICD-10-CM

## 2020-08-31 DIAGNOSIS — D22 MELANOCYTIC NEVI: ICD-10-CM

## 2020-08-31 DIAGNOSIS — L91.8 OTHER HYPERTROPHIC DISORDERS OF THE SKIN: ICD-10-CM

## 2020-08-31 DIAGNOSIS — L60.1 ONYCHOLYSIS: ICD-10-CM

## 2020-08-31 DIAGNOSIS — B07.8 OTHER VIRAL WARTS: ICD-10-CM

## 2020-08-31 DIAGNOSIS — L72.11 PILAR CYST: ICD-10-CM

## 2020-08-31 PROBLEM — D22.5 MELANOCYTIC NEVI OF TRUNK: Status: ACTIVE | Noted: 2020-08-31

## 2020-08-31 PROBLEM — D18.01 HEMANGIOMA OF SKIN AND SUBCUTANEOUS TISSUE: Status: ACTIVE | Noted: 2020-08-31

## 2020-08-31 PROCEDURE — ? ADDITIONAL NOTES

## 2020-08-31 PROCEDURE — ? SUNSCREEN RECOMMENDATIONS

## 2020-08-31 PROCEDURE — ? FULL BODY SKIN EXAM

## 2020-08-31 PROCEDURE — 99203 OFFICE O/P NEW LOW 30 MIN: CPT | Mod: 25

## 2020-08-31 PROCEDURE — 17110 DESTRUCTION B9 LES UP TO 14: CPT

## 2020-08-31 PROCEDURE — ? COUNSELING

## 2020-08-31 PROCEDURE — ? LIQUID NITROGEN

## 2020-08-31 ASSESSMENT — LOCATION SIMPLE DESCRIPTION DERM
LOCATION SIMPLE: RIGHT GREAT TOE
LOCATION SIMPLE: RIGHT EYELID
LOCATION SIMPLE: UPPER BACK
LOCATION SIMPLE: POSTERIOR SCALP
LOCATION SIMPLE: LEFT INDEX FINGER
LOCATION SIMPLE: SCALP
LOCATION SIMPLE: ABDOMEN
LOCATION SIMPLE: RIGHT SHOULDER
LOCATION SIMPLE: RIGHT LOWER BACK
LOCATION SIMPLE: LEFT SCALP
LOCATION SIMPLE: LEFT OCCIPITAL SCALP
LOCATION SIMPLE: LEFT EYEBROW
LOCATION SIMPLE: RIGHT OCCIPITAL SCALP

## 2020-08-31 ASSESSMENT — LOCATION ZONE DERM
LOCATION ZONE: TRUNK
LOCATION ZONE: FACE
LOCATION ZONE: SCALP
LOCATION ZONE: ARM
LOCATION ZONE: FINGER
LOCATION ZONE: EYELID
LOCATION ZONE: TOENAIL

## 2020-08-31 ASSESSMENT — LOCATION DETAILED DESCRIPTION DERM
LOCATION DETAILED: RIGHT ANTERIOR SHOULDER
LOCATION DETAILED: LEFT MEDIAL FRONTAL SCALP
LOCATION DETAILED: INFERIOR THORACIC SPINE
LOCATION DETAILED: LEFT OCCIPITAL SCALP
LOCATION DETAILED: MID-OCCIPITAL SCALP
LOCATION DETAILED: RIGHT LATERAL CANTHUS
LOCATION DETAILED: RIGHT SUPERIOR PARIETAL SCALP
LOCATION DETAILED: RIGHT OCCIPITAL SCALP
LOCATION DETAILED: RIGHT INFERIOR MEDIAL MIDBACK
LOCATION DETAILED: LEFT SUPERIOR OCCIPITAL SCALP
LOCATION DETAILED: LEFT LATERAL EYEBROW
LOCATION DETAILED: RIGHT GREAT TOENAIL
LOCATION DETAILED: LEFT DISTAL RADIAL PALMAR INDEX FINGER
LOCATION DETAILED: EPIGASTRIC SKIN
LOCATION DETAILED: RIGHT SUPERIOR OCCIPITAL SCALP

## 2020-08-31 NOTE — PROCEDURE: LIQUID NITROGEN
Add 52 Modifier (Optional): no
Detail Level: Detailed
Consent: The patient's consent was obtained including but not limited to risks of crusting, scabbing, blistering, scarring, darker or lighter pigmentary change, recurrence, incomplete removal and infection.
Number Of Freeze-Thaw Cycles: 1 freeze-thaw cycle
Render Post-Care Instructions In Note?: yes
Post-Care Instructions: I reviewed with the patient in detail post-instructions. Patient is to wear sunprotection, and avoid picking at any of the treated lesions. Pt may apply Vaseline to crusted or scabbing areas.
Medical Necessity Clause: This procedure was medically necessary because the lesions that were treated were:
Medical Necessity Information: It is in your best interest to select a reason for this procedure from the list below. All of these items fulfill various CMS LCD requirements except the new and changing color options.

## 2020-08-31 NOTE — HPI: EVALUATION OF SKIN LESION(S)
What Type Of Note Output Would You Prefer (Optional)?: Bullet Format
Hpi Title: Evaluation of Skin Lesions
How Severe Are Your Spot(S)?: mild
Have Your Spot(S) Been Treated In The Past?: has not been treated
Additional History: Patient has scattered areas on scalp and abd

## 2020-08-31 NOTE — PROCEDURE: ADDITIONAL NOTES
Detail Level: Simple
Additional Notes: Patient consent was obtained to proceed with the visit and recommended plan of care after discussion of all risks and benefits, including the risks of COVID-19 exposure.
Additional Notes: Discussed potential for ILK in future since pt anxious about surgery.
Detail Level: Detailed

## 2020-08-31 NOTE — TELEPHONE ENCOUNTER
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 I'd like to stay on the 20 a little longer, unless you want to change. If we could have a phone call or tele health because I only have three or four more days left of the 20 and I'll need it called into [facility] in [place] it'll probably have to be overridden because we did the 40 not too long ago. Sorry for the excessive emails . trying to work this out and brainstorm.  Thank you Merle Fitzgerald

## 2020-09-02 ENCOUNTER — OFFICE VISIT (OUTPATIENT)
Dept: FAMILY MEDICINE CLINIC | Age: 55
End: 2020-09-02
Payer: COMMERCIAL

## 2020-09-02 VITALS
BODY MASS INDEX: 38.92 KG/M2 | HEIGHT: 67 IN | TEMPERATURE: 98.2 F | HEART RATE: 82 BPM | DIASTOLIC BLOOD PRESSURE: 79 MMHG | OXYGEN SATURATION: 100 % | RESPIRATION RATE: 14 BRPM | WEIGHT: 248 LBS | SYSTOLIC BLOOD PRESSURE: 129 MMHG

## 2020-09-02 PROCEDURE — 99214 OFFICE O/P EST MOD 30 MIN: CPT | Performed by: FAMILY MEDICINE

## 2020-09-02 PROCEDURE — 90686 IIV4 VACC NO PRSV 0.5 ML IM: CPT | Performed by: FAMILY MEDICINE

## 2020-09-02 PROCEDURE — 90471 IMMUNIZATION ADMIN: CPT | Performed by: FAMILY MEDICINE

## 2020-09-02 NOTE — PROGRESS NOTES
Vaccine Information Sheet, Starr Kuhn - Inactivated\"  given to Macie García, or parent/legal guardian of  Macie García and verbalized understanding. Patient responses:    Have you ever had a reaction to a flu vaccine? No  Do you have any current illness? No  Have you ever had Guillian Reno Syndrome? No  Do you have a serious allergy to any of the follow: Neomycin, Polymyxin, Thimerosal, eggs or egg products? No    Flu vaccine given per order. Please see immunization tab. Risks and benefits explained. Current VIS given.

## 2020-09-02 NOTE — PROGRESS NOTES
Subjective:      Patient ID: Felicita Sanchez 48 y.o. female. The encounter diagnosis was Depression with anxiety. HPI    Lakshmi Rodriguez notes she felt better at fist when on the Fetzima 40 mg. She felt more motivated and less depressed. However it caused sweating and sleepiness. She dropped back to the 20 mg (taking at bedtime) and is taking it at night. Sweating is better but she still has daytime sleepiness. Does fine if active but has trouble staying awake if she has to sit still. She is not sure if this is the medication or because she is not going to bed. not getting enough sleep. She has trouble making herself go to bed chronically. Many days she does not care. She is off work on disability and does not care if she ever goes back. She says that is not typical for her. Currently her mood is flat - not happy, not side.  + Anhedonia. Not suicidal. Appetite is fine. But better with Fetzima. She was happy that [address] did not cause increase in appetite. She is seeing a therapist with plan for EMDR once the therapist knows her better. Her stress is better; trial for custody of her grandchild is over.       Outpatient Medications Marked as Taking for the 9/2/20 encounter (Office Visit) with Leslie Bueno MD   Medication Sig Dispense Refill    levomilnacipran ([address] [place] [address]) 20 MG CP24 extended release capsule Take 1 capsule by mouth daily 30 capsule 5    spironolactone (ALDACTONE) 25 MG tablet TAKE 1 TABLET BY MOUTH TWO  TIMES DAILY 180 tablet 2    lansoprazole (PREVACID) 30 MG delayed release capsule TAKE 1 CAPSULE BY MOUTH  DAILY 2 TIMES DAILY 180 capsule 3    fluticasone (FLONASE) 50 MCG/ACT nasal spray 1 spray by Nasal route daily 3 Bottle 3    valACYclovir (VALTREX) 500 MG tablet TAKE ONE TABLET BY MOUTH TWICE A DAY FOR 5 DAYS 10 tablet 12    acyclovir (ZOVIRAX) 5 % ointment APPLY TOPICALLY EVERY 3  HOURS 90 g 3    LORATADINE PO Take 1 tablet by mouth as needed      Glucosamine 500 MG CAPS Take by mouth      therapeutic multivitamin-minerals (THERAGRAN-M) tablet Take 1 tablet by mouth daily.             Allergies   Allergen Reactions    Macrodantin [Nitrofurantoin]     Sulfa Antibiotics Hives       Patient Active Problem List   Diagnosis    Irritable bowel syndrome    Esophageal reflux    Premenstrual tension syndrome    Migraine without aura    Allergic rhinitis    Asthma, exercise induced    ADD (attention deficit disorder)    Herpes simplex    Fibromyalgia    Depression with anxiety       Past Medical History:   Diagnosis Date    Benign neoplasm of breast     Chondromalacia of patellofemoral joint 9/2/2015    Elevated liver enzymes 4/19/2013    Patellofemoral dysfunction 8/14/2012    Personal history of cervical dysplasia     Pilonidal cyst without mention of abscess     Sciatica of right side 11/28/2011    TMJ arthropathy 3/14/2014    Trochanteric bursitis of right hip 9/2/2015    Urethral stricture unspecified        Past Surgical History:   Procedure Laterality Date    ARTHROPLASTY      temporomandibular joint    ENDOMETRIAL ABLATION  5/9289    Dr. Nils LEAL      cervical conization    NASAL SEPTUM SURGERY      POLYPECTOMY      nasal endoscopy    SINUS SURGERY  03/2017    Endoscopic sinus surgery        Family History   Problem Relation Age of Onset    Cancer Mother         skin cancer    Other Father         PVD    Other Brother         a&w    Hypertension Paternal Grandmother     Other Paternal Grandfather         myeloma       Social History     Tobacco Use    Smoking status: Never Smoker    Smokeless tobacco: Never Used   Substance Use Topics    Alcohol use: No    Drug use: No       Lab Results   Component Value Date     06/25/2020    K 4.4 06/25/2020     06/25/2020    CO2 21 06/25/2020    BUN 9 06/25/2020    CREATININE 0.6 06/25/2020    GLUCOSE 103 (H) 06/25/2020    CALCIUM 9.3 06/25/2020    PROT 7.0 06/25/2020    LABALBU 4.4 06/25/2020    BILITOT 1.1 (H) 06/25/2020    ALKPHOS 77 06/25/2020    AST 22 06/25/2020    ALT 30 06/25/2020    LABGLOM >60 06/25/2020    GFRAA >60 06/25/2020    AGRATIO 1.7 06/25/2020    GLOB 2.6 06/25/2020        Lab Results   Component Value Date    WBC 8.9 06/25/2020    HGB 13.3 06/25/2020    HCT 41.8 06/25/2020    MCV 81.4 06/25/2020     06/25/2020     TSH   Date Value Ref Range Status   06/25/2020 1.35 0.27 - 4.20 uIU/mL Final   11/15/2019 1.14 0.27 - 4.20 uIU/mL Final   01/26/2018 1.17 0.27 - 4.20 uIU/mL Final   04/19/2013 1.57 0.35 - 5.5 uIU/ml Final   08/14/2012 1.04 0.35 - 5.5 uIU/ml Final   08/23/2011 1.22 0.35 - 5.5 uIU/ml Final            Review of Systems  Review of Systems    Objective:   Physical Exam  Vitals:    09/02/20 1335   BP: 129/79   Pulse: 82   Resp: 14   Temp: 98.2 °F (36.8 °C)   TempSrc: Temporal   SpO2: 100%   Weight: 248 lb (112.5 kg)   Height: 5' 6.5\" (1.689 m)       Physical Exam    NAD  Skin is warm and dry. Mood and affect are moderately anxious and depressed. Mild pressured speech. No agitation or psychomotor retardation. No grandiosity or tangential thoughts. Insight and judgement are intact. Not suicidal.   The neck is supple and free of adenopathy or masses, the thyroid is normal without enlargement or nodules. Chest is clear, no wheezing or rales. Normal symmetric air entry throughout both lung fields. Heart regular with normal rate, no murmer or gallop     Assessment:       Diagnosis Orders   1. Depression with anxiety  VORTIoxetine (TRINTELLIX) 5 MG tablet    not controlled. start with 5 mg; she will let me know how she is doing via e visit in 2 weeks. Can increase dose if needed. Continue with psychology. Stop Fetzima due to side effects. Side effects of current medications reviewed and questions answered. Plan:        Follow up in 4 weeks or prn.

## 2020-09-04 ENCOUNTER — PATIENT MESSAGE (OUTPATIENT)
Dept: FAMILY MEDICINE CLINIC | Age: 55
End: 2020-09-04

## 2020-09-04 NOTE — TELEPHONE ENCOUNTER
Please advise      , I know you said two weeks on the trintellix. I think I need to double it already because it's making me a little hyper and I want to see if it does that when I take 10 mg..  it does not make me tired at night so I may have to switch to the day it's not making me tired at all. Definitely a different feeling than the fetzima. Of course it'll take the two weeks to figure out all the other good or bad side effects I just wanted to ask if it was okay to double it sooner than later I can wait. It just seems to be acting more like a regular SSRI That's why I'm hoping with a higher dose it'll be a good combination since it's an SNRI. Thanks.  Lauri Pablo

## 2020-09-21 ENCOUNTER — APPOINTMENT (RX ONLY)
Dept: URBAN - METROPOLITAN AREA CLINIC 170 | Facility: CLINIC | Age: 55
Setting detail: DERMATOLOGY
End: 2020-09-21

## 2020-09-21 DIAGNOSIS — L60.1 ONYCHOLYSIS: ICD-10-CM

## 2020-09-21 DIAGNOSIS — B07.8 OTHER VIRAL WARTS: ICD-10-CM

## 2020-09-21 DIAGNOSIS — L57.3 POIKILODERMA OF CIVATTE: ICD-10-CM

## 2020-09-21 PROCEDURE — 99213 OFFICE O/P EST LOW 20 MIN: CPT | Mod: 25

## 2020-09-21 PROCEDURE — ? LIQUID NITROGEN

## 2020-09-21 PROCEDURE — 17110 DESTRUCTION B9 LES UP TO 14: CPT

## 2020-09-21 PROCEDURE — ? ADDITIONAL NOTES

## 2020-09-21 PROCEDURE — ? COUNSELING

## 2020-09-21 PROCEDURE — ? NAIL CLIPPING FOR PAS

## 2020-09-21 ASSESSMENT — LOCATION SIMPLE DESCRIPTION DERM
LOCATION SIMPLE: LEFT INDEX FINGER
LOCATION SIMPLE: RIGHT ANTERIOR NECK
LOCATION SIMPLE: LEFT ANTERIOR NECK
LOCATION SIMPLE: LEFT GREAT TOE
LOCATION SIMPLE: RIGHT GREAT TOE

## 2020-09-21 ASSESSMENT — LOCATION ZONE DERM
LOCATION ZONE: NECK
LOCATION ZONE: TOENAIL
LOCATION ZONE: FINGER

## 2020-09-21 ASSESSMENT — LOCATION DETAILED DESCRIPTION DERM
LOCATION DETAILED: LEFT GREAT TOENAIL
LOCATION DETAILED: LEFT SUPERIOR LATERAL NECK
LOCATION DETAILED: RIGHT GREAT TOENAIL
LOCATION DETAILED: RIGHT INFERIOR LATERAL NECK
LOCATION DETAILED: LEFT DISTAL RADIAL PALMAR INDEX FINGER

## 2020-09-21 NOTE — PROCEDURE: NAIL CLIPPING FOR PAS
Detail Level: Detailed
Billing Type: Third-Party Bill
Add 51349 To Bill?: No
Lab: -[id number]
Lab Facility: [id number]

## 2020-09-21 NOTE — PROCEDURE: LIQUID NITROGEN
Add 52 Modifier (Optional): no
Detail Level: Detailed
Consent: The patient's consent was obtained including but not limited to risks of crusting, scabbing, blistering, scarring, darker or lighter pigmentary change, recurrence, incomplete removal and infection.
Number Of Freeze-Thaw Cycles: 2 freeze-thaw cycles
Render Post-Care Instructions In Note?: yes
Post-Care Instructions: I reviewed with the patient in detail post-instructions. Patient is to wear sunprotection, and avoid picking at any of the treated lesions. Pt may apply Vaseline to crusted or scabbing areas.
Medical Necessity Clause: This procedure was medically necessary because the lesions that were treated were:
Medical Necessity Information: It is in your best interest to select a reason for this procedure from the list below. All of these items fulfill various CMS LCD requirements except the new and changing color options.

## 2020-09-22 NOTE — TELEPHONE ENCOUNTER
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 I do feel tired during the day, but sleeping better the more I take it    It upsets my stomach somewhat. I think that's just any of these medicines. I just can't decide whether I like it better than the fetzima because I was so focused on the sweats. I guess go ahead and call it in because I need some I'm not going to have enough to get me through the end of the week. I don't know if you should call it in in the 5mg. and I'll just double it for a while or call it in in the 10 mg. I would assume you want me on the 10mg  at least the five is probably too light. I just have to get completely used to it.  Going to take at 9:00 p.m. instead of 10:30

## 2020-09-29 ENCOUNTER — RX ONLY (OUTPATIENT)
Age: 55
Setting detail: RX ONLY
End: 2020-09-29

## 2020-09-29 RX ORDER — POLY-UREAURETHANE 16 %
NAIL FILM SOLUTION (ML) TOPICAL
Qty: 1 | Refills: 5 | Status: ERX | COMMUNITY
Start: 2020-09-29

## 2020-10-22 PROBLEM — F41.8 DEPRESSION WITH ANXIETY: Chronic | Status: ACTIVE | Noted: 2020-01-24

## 2020-10-22 PROBLEM — E66.9 NON MORBID OBESITY, UNSPECIFIED OBESITY TYPE: Status: ACTIVE | Noted: 2020-10-22

## 2020-10-22 PROBLEM — M79.7 FIBROMYALGIA: Chronic | Status: ACTIVE | Noted: 2017-05-05

## 2020-11-02 ENCOUNTER — OFFICE VISIT (OUTPATIENT)
Dept: PULMONOLOGY | Age: 55
End: 2020-11-02
Payer: COMMERCIAL

## 2020-11-02 VITALS
HEART RATE: 75 BPM | HEIGHT: 67 IN | SYSTOLIC BLOOD PRESSURE: 132 MMHG | DIASTOLIC BLOOD PRESSURE: 80 MMHG | BODY MASS INDEX: 39.55 KG/M2 | WEIGHT: 252 LBS | OXYGEN SATURATION: 99 %

## 2020-11-02 PROBLEM — E66.9 NON MORBID OBESITY, UNSPECIFIED OBESITY TYPE: Chronic | Status: ACTIVE | Noted: 2020-10-22

## 2020-11-02 PROCEDURE — 99244 OFF/OP CNSLTJ NEW/EST MOD 40: CPT | Performed by: INTERNAL MEDICINE

## 2020-11-02 ASSESSMENT — SLEEP AND FATIGUE QUESTIONNAIRES
HOW LIKELY ARE YOU TO NOD OFF OR FALL ASLEEP WHILE WATCHING TV: 1
ESS TOTAL SCORE: 6
HOW LIKELY ARE YOU TO NOD OFF OR FALL ASLEEP WHILE SITTING QUIETLY AFTER LUNCH WITHOUT ALCOHOL: 0
HOW LIKELY ARE YOU TO NOD OFF OR FALL ASLEEP WHILE SITTING INACTIVE IN A PUBLIC PLACE: 0
HOW LIKELY ARE YOU TO NOD OFF OR FALL ASLEEP IN A CAR, WHILE STOPPED FOR A FEW MINUTES IN TRAFFIC: 0
HOW LIKELY ARE YOU TO NOD OFF OR FALL ASLEEP WHILE SITTING AND TALKING TO SOMEONE: 0
HOW LIKELY ARE YOU TO NOD OFF OR FALL ASLEEP WHILE LYING DOWN TO REST IN THE AFTERNOON WHEN CIRCUMSTANCES PERMIT: 3
HOW LIKELY ARE YOU TO NOD OFF OR FALL ASLEEP WHILE SITTING AND READING: 2
HOW LIKELY ARE YOU TO NOD OFF OR FALL ASLEEP WHEN YOU ARE A PASSENGER IN A CAR FOR AN HOUR WITHOUT A BREAK: 0

## 2020-11-02 NOTE — PROGRESS NOTES
Clearance Leilani CALABRESE, FAA, [facility]  Tiffanie Kehrisaiah [address]  3rd Floor,  [address] [facility], [facility] Eleni Lora E [phone]   [facility] [address] Abbie Platt. [address] [facility]. Lucy Turcios [address] [phone]     [address] [place] [address] [phone]  Dept: [phone]  Loc: [phone]    Assessment:      Visit Diagnoses and Associated Orders     Hypersomnia   (New Problem)  -  Primary    needs work-up    Home Sleep Study (HST) [68087 Custom]   - Future Order         Snoring   (New Problem)      needs work-up    Home Sleep Study (HST) [[id number] Custom]   - Future Order         Migraine without aura and without status migrainosus, not intractable   (Stable)           Allergic rhinitis, unspecified seasonality, unspecified trigger   (Stable)           Depression with anxiety   (Stable)           Morbid obesity, unspecified obesity type (HCC)   (Stable)                  Plan:      Differential diagnosis includes but not limited to: ALEXIA, PLMD's, narcolepsy, parasomnias. Reviewed ALEXIA (highest likelihood Dx): pathophysiology, diagnosis, complications and treatment. Instructed her not to drive if drowsy. Continue medications per her PCP and other physicians. Limit caffeine use after 3pm. Standard of care is to do in-lab PSG but insurance is mandating an inferior HST. 1 wk follow up after study to review her results. The chronic medical conditions listed are directly related to the primary diagnosis listed above. The management of the primary diagnosis affects the secondary diagnosis and vice versa. Continue meds for: AR, migrines, and depression/ROSIBEL. Pt would medically benefit from wt loss for ALEXIA (diet, exercise, surgical). Orders Placed This Encounter   Procedures    Home Sleep Study (HST)          Subjective:     Patient ID: Domi Nunes is a 48 y.o. female.     Chief Complaint   Patient presents with    New Patient    Fatigue HPI:      Talon Garcia is a 48 y.o. female referred by Niyah Cheng MD for a sleep evaluation. She complains of: snoring, excessive daytime sleepiness , non-restorative sleep, nocturia, snorting awake, AM headaches, napping, decreased concentration, short term memory issues and tossing and turning at night. She denies: cataplexy and hypnagogic hallucinations. Migraines, allergic rhinitis, depression/ROSIBEL, and obesity: stable on meds and followed by pt's PCP and other physicians. Previous evaluation and treatment has included- none    DOT/CDL - No  FAA/'s license -No    Previous Report(s) Reviewed: historical medical records, office notes, andreferral letter(s). Pertinent data has been documented. [place] - Total score: 6    Caffeine Intake - Hot tea: 1 cup(s) per day    Social History     Socioeconomic History    Marital status:       Spouse name: Not on file    Number of children: Not on file    Years of education: Not on file    Highest education level: Not on file   Occupational History    Not on file   Social Needs    Financial resource strain: Not on file    Food insecurity     Worry: Not on file     Inability: Not on file    Transportation needs     Medical: Not on file     Non-medical: Not on file   Tobacco Use    Smoking status: Never Smoker    Smokeless tobacco: Never Used   Substance and Sexual Activity    Alcohol use: No    Drug use: No    Sexual activity: Yes     Partners: Male   Lifestyle    Physical activity     Days per week: Not on file     Minutes per session: Not on file    Stress: Not on file   Relationships    Social connections     Talks on phone: Not on file     Gets together: Not on file     Attends Faith service: Not on file     Active member of club or organization: Not on file     Attends meetings of clubs or organizations: Not on file     Relationship status: Not on file    Intimate partner violence     Fear of current or ex partner: Not on file     Emotionally abused: Not on file     Physically abused: Not on file     Forced sexual activity: Not on file   Other Topics Concern    Not on file   Social History Narrative    Not on file        Current Outpatient Medications   Medication Sig Dispense Refill    VORTIoxetine (TRINTELLIX) 10 MG TABS tablet Take 1 tablet by mouth daily 30 tablet 2    spironolactone (ALDACTONE) 25 MG tablet TAKE 1 TABLET BY MOUTH TWO  TIMES DAILY 180 tablet 2    lansoprazole (PREVACID) 30 MG delayed release capsule TAKE 1 CAPSULE BY MOUTH  DAILY 2 TIMES DAILY 180 capsule 3    fluticasone (FLONASE) 50 MCG/ACT nasal spray 1 spray by Nasal route daily 3 Bottle 3    valACYclovir (VALTREX) 500 MG tablet TAKE ONE TABLET BY MOUTH TWICE A DAY FOR 5 DAYS 10 tablet 12    acyclovir (ZOVIRAX) 5 % ointment APPLY TOPICALLY EVERY 3  HOURS 90 g 3    LORATADINE PO Take 1 tablet by mouth as needed      Glucosamine 500 MG CAPS Take by mouth      therapeutic multivitamin-minerals (THERAGRAN-M) tablet Take 1 tablet by mouth daily. No current facility-administered medications for this visit.         Allergies as of 11/02/2020 - Review Complete 11/02/2020   Allergen Reaction Noted    Macrodantin [nitrofurantoin]  12/04/2010    Sulfa antibiotics Hives 02/20/2017       Patient Active Problem List   Diagnosis    Irritable bowel syndrome    Esophageal reflux    Premenstrual tension syndrome    Migraine without aura    Allergic rhinitis    Asthma, exercise induced    ADD (attention deficit disorder)    Herpes simplex    Fibromyalgia    Depression with anxiety    Non morbid obesity, unspecified obesity type       Past Medical History:   Diagnosis Date    Benign neoplasm of breast     Chondromalacia of patellofemoral joint 9/2/2015    Elevated liver enzymes 4/19/2013    Patellofemoral dysfunction 8/14/2012    Personal history of cervical dysplasia     Pilonidal cyst without mention of abscess     Sciatica of right side 11/28/2011    TMJ arthropathy 3/14/2014    Trochanteric bursitis of right hip 9/2/2015    Urethral stricture unspecified        Past Surgical History:   Procedure Laterality Date    ARTHROPLASTY      temporomandibular joint    ENDOMETRIAL ABLATION  8/9710    Dr. Yolie LEAL      cervical conization    NASAL SEPTUM SURGERY      POLYPECTOMY      nasal endoscopy    SINUS SURGERY  03/2017    Endoscopic sinus surgery    TONSILLECTOMY         Family History   Problem Relation Age of Onset    Cancer Mother         skin cancer    Other Father         PVD    Other Brother         a&w    Hypertension Paternal Grandmother     Other Paternal Grandfather         myeloma       Objective:     Vitals:  Weight BMI   Wt Readings from Last 3 Encounters:   11/02/20 252 lb (114.3 kg)   09/02/20 248 lb (112.5 kg)   07/13/20 253 lb 12.8 oz (115.1 kg)    Body mass index is 40.06 kg/m².      BP HR SaO2   BP Readings from Last 3 Encounters:   11/02/20 132/80   09/02/20 129/79   07/13/20 124/80    Pulse Readings from Last 3 Encounters:   11/02/20 75   09/02/20 82   07/13/20 83    SpO2 Readings from Last 3 Encounters:   11/02/20 99%   09/02/20 100%   07/13/20 99%        Electronically signed by Erika Cabrales MD on11/2/2020 at 12:03 PM

## 2020-11-02 NOTE — LETTER
[facility] Sleep Medicine  [address] [facility] [address]  Phone: [phone]  Fax: [phone]      November 2, 2020       Patient: Sonu Bravo   MR Number: <[id number]>   YOB: 1965   Date of Visit: 11/2/2020     Thank you for allowing me to participate in the care of Lima Schafer. Here is my assessment and plan. Also attached is a copy of her consult note:    ASSESSMENT:  Visit Diagnoses and Associated Orders     Hypersomnia   (New Problem)  -  Primary    needs work-up    Home Sleep Study (HST) [[id number] Custom]   - Future Order         Snoring   (New Problem)      needs work-up    Home Sleep Study (HST) [[id number] Custom]   - Future Order         Migraine without aura and without status migrainosus, not intractable   (Stable)           Allergic rhinitis, unspecified seasonality, unspecified trigger   (Stable)           Depression with anxiety   (Stable)           Morbid obesity, unspecified obesity type ([address]   (Stable)                 Plan:  Differential diagnosis includes but not limited to: ALEXIA, PLMD's, narcolepsy, parasomnias. Reviewed ALEXIA (highest likelihood Dx): pathophysiology, diagnosis, complications and treatment. Instructed her not to drive if drowsy. Continue medications per her PCP and other physicians. Limit caffeine use after 3pm. Standard of care is to do in-lab PSG but insurance is mandating an inferior HST. 1 wk follow up after study to review her results. The chronic medical conditions listed are directly related to the primary diagnosis listed above. The management of the primary diagnosis affects the secondary diagnosis and vice versa. Continue meds for: AR, migrines, and depression/ROSIBEL. Pt would medically benefit from wt loss for ALEXIA (diet, exercise, surgical). Orders Placed This Encounter   Procedures    Home Sleep Study (HST)         If you have questions or concerns, please do not hesitate to call me.  I

## 2020-11-05 ENCOUNTER — PATIENT MESSAGE (OUTPATIENT)
Dept: FAMILY MEDICINE CLINIC | Age: 55
End: 2020-11-05

## 2020-11-05 NOTE — TELEPHONE ENCOUNTER
From: Amandeep Cordero  To: Guillaume Longoria MD  Sent: 11/5/2020  3:32 PM EST  Subject: Prescription Question    Dr. Jin Tony, I am scheduled for that home sleep study for the 11th of November. I'm scheduled with a first appointment was [facility] on November 9th. In the meantime I was hoping you could send in a prescription for 5 mg trintrilix. I really feel that I have too much serotonin it's giving me a little bit of tiredness and a little bit of a flat affect. My counselor noticed. I've tried to cut it but the pill was an odd size so I don't think I'm getting it correct. It's not a [place] or oblong it's a unique size pill and it's very hard to cut. I was just hoping that you could prescribe 5mg  before I see this new psy that way she could either add something to go with it or change it . That way I would already be on a lower dose.  Thank you

## 2020-11-11 ENCOUNTER — HOSPITAL ENCOUNTER (OUTPATIENT)
Dept: SLEEP CENTER | Age: 55
Discharge: HOME | End: 2020-11-11
Payer: COMMERCIAL

## 2020-11-11 PROCEDURE — 95806 SLEEP STUDY UNATT&RESP EFFT: CPT | Performed by: INTERNAL MEDICINE

## 2020-11-11 PROCEDURE — 95806 SLEEP STUDY UNATT&RESP EFFT: CPT

## 2020-11-16 ENCOUNTER — TELEPHONE (OUTPATIENT)
Dept: PULMONOLOGY | Age: 55
End: 2020-11-16

## 2020-11-20 ENCOUNTER — APPOINTMENT (RX ONLY)
Dept: URBAN - METROPOLITAN AREA CLINIC 170 | Facility: CLINIC | Age: 55
Setting detail: DERMATOLOGY
End: 2020-11-20

## 2020-11-20 DIAGNOSIS — B07.8 OTHER VIRAL WARTS: ICD-10-CM

## 2020-11-20 DIAGNOSIS — L60.1 ONYCHOLYSIS: ICD-10-CM

## 2020-11-20 PROCEDURE — ? ADDITIONAL NOTES

## 2020-11-20 PROCEDURE — ? COUNSELING

## 2020-11-20 ASSESSMENT — LOCATION DETAILED DESCRIPTION DERM: LOCATION DETAILED: RIGHT 2ND TOENAIL

## 2020-11-20 ASSESSMENT — LOCATION ZONE DERM: LOCATION ZONE: TOENAIL

## 2020-11-20 ASSESSMENT — LOCATION SIMPLE DESCRIPTION DERM: LOCATION SIMPLE: RIGHT 2ND TOE

## 2020-11-20 NOTE — PROCEDURE: ADDITIONAL NOTES
Additional Notes: Patient consent was obtained to proceed with the visit and recommended plan of care after discussion of all risks and benefits, including the risks of COVID-19 exposure.
Detail Level: Simple
Detail Level: Detailed
Additional Notes: Discussed doing vinegar soak.
Additional Notes: Wart resolved.

## 2020-12-06 ENCOUNTER — PATIENT MESSAGE (OUTPATIENT)
Dept: FAMILY MEDICINE CLINIC | Age: 55
End: 2020-12-06

## 2020-12-07 ENCOUNTER — PATIENT MESSAGE (OUTPATIENT)
Dept: FAMILY MEDICINE CLINIC | Age: 55
End: 2020-12-07

## 2020-12-07 RX ORDER — SPIRONOLACTONE 25 MG/1
TABLET ORAL
Qty: 180 TABLET | Refills: 1 | Status: SHIPPED | OUTPATIENT
Start: 2020-12-07 | End: 2020-12-08 | Stop reason: SDUPTHER

## 2020-12-07 RX ORDER — LANSOPRAZOLE 30 MG/1
CAPSULE, DELAYED RELEASE ORAL
Qty: 180 CAPSULE | Refills: 1 | Status: SHIPPED | OUTPATIENT
Start: 2020-12-07 | End: 2020-12-08 | Stop reason: SDUPTHER

## 2020-12-07 NOTE — TELEPHONE ENCOUNTER
From: Alysha Kamara  To: Jessi Scott MD  Sent: 12/6/2020 10:18 PM EST  Subject: Prescription Question    Dr. Victor Hugo Dia,. I was trying to get refills on my lanzaprozole and spiralactone. The first is listed under [facility] and I wanted under [facility] like the spiralactone. That's why I had to do it through this system it wouldn't let me change the pharmacy on the refill section or I couldn't figure it out.  Thank you

## 2020-12-08 RX ORDER — SPIRONOLACTONE 25 MG/1
TABLET ORAL
Qty: 180 TABLET | Refills: 1 | Status: SHIPPED | OUTPATIENT
Start: 2020-12-08 | End: 2021-10-19 | Stop reason: SDUPTHER

## 2020-12-08 RX ORDER — LANSOPRAZOLE 30 MG/1
CAPSULE, DELAYED RELEASE ORAL
Qty: 180 CAPSULE | Refills: 1 | Status: SHIPPED | OUTPATIENT
Start: 2020-12-08 | End: 2021-06-14 | Stop reason: ALTCHOICE

## 2020-12-08 NOTE — TELEPHONE ENCOUNTER
From: Miller Hernandez  To: Jamal Magallon MD  Sent: 12/7/2020 9:57 PM EST  Subject: Prescription Question    Patsy,. I remembered why she did it with [facility] last time I think I had to go through [email or web address] because they stopped covering and wanted to buy over the counter. I could be wrong I'll have you guys check my chart jorge I can't remember specifically. if you have to send it to [facility] that's fine they'll run it through [employer]      ----- Message -----   From:RHONA HUNT   Sent:12/7/2020 10:14 AM EST   To:Angel Luis Tate   Subject:RE: Prescription Question    Susan Bradford, We have received your PO-MO message and have forwarded it on to your physician. Please allow your physician 24-48 hours to respond. If you have an urgent message please call our office. Thanks and have a great day!      ----- Message -----   From:Angel Luis Tate   Sent:12/6/2020 10:18 PM EST   To:Ros Youngblood MD   Subject:Prescription Question    Dr. Chandler Anderson,. I was trying to get refills on my lanzaprozole and spiralactone. The first is listed under [facility] and I wanted under [facility] like the spiralactone. That's why I had to do it through this system it wouldn't let me change the pharmacy on the refill section or I couldn't figure it out.  Thank you

## 2020-12-09 ENCOUNTER — TELEPHONE (OUTPATIENT)
Dept: FAMILY MEDICINE CLINIC | Age: 55
End: 2020-12-09

## 2020-12-09 NOTE — TELEPHONE ENCOUNTER
Office has been notified that pt is requiring Prior Authorization for the following medication:  -- PA Request - Spironolactone    Please initiate this request through CoverMyMeds, contacting the following Payor/Insurance:  -- [address]    Please see below, or the documentation attached to this encounter for any additional information that may assist in processing PA:  --     Thank you!

## 2020-12-14 NOTE — TELEPHONE ENCOUNTER
Spironolactone 25MG tablets  N/A on December 10  This medication or product is on your plan's list of covered drugs.  Prior authorization is not required at this time

## 2020-12-22 ENCOUNTER — PATIENT MESSAGE (OUTPATIENT)
Dept: FAMILY MEDICINE CLINIC | Age: 55
End: 2020-12-22

## 2020-12-22 NOTE — TELEPHONE ENCOUNTER
FYI      Forms printed and placed at [facility], [facility] desk

## 2020-12-22 NOTE — TELEPHONE ENCOUNTER
From: Orvis Given  To: Landon Arias MD  Sent: 12/22/2020 10:53 AM EST  Subject: Non-Urgent Medical Question    Dr. Jones Like: update and paperwork for flu and medical necessity. Flu paperwork I can get $75 if you sign it and put the date and fax it. They want us to verify this for the state of [place]. Second is medical necessity the company changed and the year changed. I don't know if it has who to fax it to on that one I'm just trying to upload it. Third update: Dr Nicole Malik put me on buspar the generic. 5mg. One time a day to build to twice a day and we will titrate off of the trintrilix probably late January. I'll keep you posted. Please call with any questions or problems with these forms. Sorry everything's electronic so hoping it'll work. Sebastien Ortiz and Happy New year.  Thanks Neli Harman

## 2021-01-07 ENCOUNTER — NURSE TRIAGE (OUTPATIENT)
Dept: OTHER | Facility: CLINIC | Age: 56
End: 2021-01-07

## 2021-01-07 ENCOUNTER — OFFICE VISIT (OUTPATIENT)
Dept: PRIMARY CARE CLINIC | Age: 56
End: 2021-01-07

## 2021-01-07 DIAGNOSIS — Z20.828 EXPOSURE TO SARS-ASSOCIATED CORONAVIRUS: Primary | ICD-10-CM

## 2021-01-07 NOTE — TELEPHONE ENCOUNTER
Patient called pre-service center [facility]) Melissa with red flag complaint. Brief description of triage: concern for covid    Triage indicates for patient to home care with covid testing    Care advice provided, patient verbalizes understanding; denies any other questions or concerns; instructed to call back for any new or worsening symptoms. Writer provided warm transfer to [place] [facility] for appointment scheduling. Attention Provider: Thank you for allowing me to participate in the care of your patient. The patient was connected to triage in response to information provided to the [facility]. Please do not respond through this encounter as the response is not directed to a shared pool. Reminders:     Call [address] Provider/Office    Care Advice - both instruction and documentation    Routing - PCP (and NP for 2nd level triage)    PLEASE DELETE ALL RED TEXT PRIOR TO SIGNING NOTE          Reason for Disposition   [1] Symptoms of COVID-19 (e.g., cough, fever, SOB, or others) AND [2] lives in an area with community spread   [1] COVID-19 infection suspected by caller or triager AND [2] mild symptoms (cough, fever, or others) AND [7] no complications or SOB    Answer Assessment - Initial Assessment Questions  1. COVID-19 CLOSE CONTACT: \"Who is the person with the confirmed or suspected COVID-19 infection that you were exposed to?\"      Patients mother    2. PLACE of CONTACT: \"Where were you when you were exposed to COVID-19? \" (e.g., home, school, medical waiting room; which city?)      [place]    3. TYPE of CONTACT: \"How much contact was there? \" (e.g., sitting next to, live in same house, work in same office, same building)      Close contact for at last 6 hours    4. DURATION of CONTACT: \"How long were you in contact with the COVID-19 patient? \" (e.g., a few seconds, passed by person, a few minutes, 15 minutes or longer, live with the patient)      At least 6 hours    5. MASK: \"Were you wearing a mask? \" \"Was the other person wearing a mask? \" Note: wearing a mask reduces the risk of an otherwise close contact. No    6. DATE of CONTACT: \"When did you have contact with a COVID-19 patient? \" (e.g., how many days ago)      Bright day    7. COMMUNITY SPREAD: \"Are there lots of cases of COVID-19 (community spread) where you live? \" (See public health department website, if unsure)        Yes    8. SYMPTOMS: \"Do you have any symptoms? \" (e.g., fever, cough, breathing difficulty, loss of taste or smell)      Fatigue, muscle pain, abdominal pain and diarrhea    9. PREGNANCY OR POSTPARTUM: \"Is there any chance you are pregnant? \" \"When was your last menstrual period? \" \"Did you deliver in the last 2 weeks? \"      LMP over the weekend, but going through menopause. Waiting to be scheduled for D&C    10. HIGH RISK: \"Do you have any heart or lung problems? Do you have a weak immune system? \" (e.g., heart failure, COPD, asthma, HIV positive, chemotherapy, renal failure, diabetes mellitus, sickle cell anemia, obesity)        No    11. TRAVEL: \"Have you traveled out of the country recently? \" If so, \"When and where? \" Also ask about out-of-state travel, since the [facility] has identified some high-risk cities for community spread in the [address] [place] [address] [address]. Note: Travel becomes less relevant if there is widespread community transmission where the patient lives.         no    Protocols used: CORONAVIRUS (COVID-19 ) EXPOSURE-ADULT-[place], CORONAVIRUS (COVID-19)  DIAGNOSED OR SUSPECTED-ADULT-[place]

## 2021-01-07 NOTE — PATIENT INSTRUCTIONS

## 2021-01-07 NOTE — PROGRESS NOTES
Linda Watts received a viral test for COVID-19. They were educated on isolation and quarantine as appropriate. For any symptoms, they were directed to seek care from their PCP, given contact information to establish with a doctor, directed to an urgent care or the emergency room.

## 2021-01-08 LAB — SARS-COV-2, NAA: NOT DETECTED

## 2021-01-18 ENCOUNTER — VIRTUAL VISIT (OUTPATIENT)
Dept: PULMONOLOGY | Age: 56
End: 2021-01-18
Payer: COMMERCIAL

## 2021-01-18 DIAGNOSIS — G47.33 OBSTRUCTIVE SLEEP APNEA SYNDROME: Primary | ICD-10-CM

## 2021-01-18 DIAGNOSIS — K21.9 GASTROESOPHAGEAL REFLUX DISEASE, UNSPECIFIED WHETHER ESOPHAGITIS PRESENT: Chronic | ICD-10-CM

## 2021-01-18 DIAGNOSIS — G43.009 MIGRAINE WITHOUT AURA AND WITHOUT STATUS MIGRAINOSUS, NOT INTRACTABLE: Chronic | ICD-10-CM

## 2021-01-18 DIAGNOSIS — F41.8 DEPRESSION WITH ANXIETY: Chronic | ICD-10-CM

## 2021-01-18 DIAGNOSIS — E66.9 NON MORBID OBESITY, UNSPECIFIED OBESITY TYPE: Chronic | ICD-10-CM

## 2021-01-18 PROCEDURE — 99214 OFFICE O/P EST MOD 30 MIN: CPT | Performed by: INTERNAL MEDICINE

## 2021-01-18 RX ORDER — BUSPIRONE HYDROCHLORIDE 5 MG/1
15 TABLET ORAL NIGHTLY
COMMUNITY
End: 2022-03-31

## 2021-01-18 ASSESSMENT — SLEEP AND FATIGUE QUESTIONNAIRES: HOW LIKELY ARE YOU TO NOD OFF OR FALL ASLEEP WHILE LYING DOWN TO REST IN THE AFTERNOON WHEN CIRCUMSTANCES PERMIT: 3

## 2021-01-18 NOTE — LETTER
[facility] Sleep Medicine  [address] [facility]  [place]  [address]  Phone: [phone]  Fax: [phone]    January 18, 2021       Patient: Tigre Arellnao   MR Number: [id number]   YOB: 1965   Date of Visit: 1/18/2021       Susanna Singh was seen for a follow up visit today. Here is my assessment and plan as well as an attached copy of her visit today:    Obstructive sleep apnea syndrome  New Problem - On Tx. Reviewed sleep study and download compliance data with patient. Supplies and parts as needed for her machine. These are medically necessary. Limit caffeine use after 3pm.  Discussed adjusting humidity for congestion if needed. 4-6 month f/u      Migraine without aura  Chronic- Stable. Cont meds per PCP and other physicians. Esophageal reflux  Chronic- Stable. Cont meds per PCP and other physicians. Depression with anxiety  Chronic- Stable. Cont meds per PCP and other physicians. Non morbid obesity, unspecified obesity type  Chronic-Stable. Encouraged her to work on weight loss through diet and exercise. If you have questions or concerns, please do not hesitate to call me. I look forward to following Mckenna Serrato along with you.     Sincerely,    Meek Tello MD    CC providers:  Amaris Goldsmith MD  [address] [place] [id number]  [address] [employer]

## 2021-01-18 NOTE — ASSESSMENT & PLAN NOTE
New Problem - On Tx. Reviewed sleep study and download compliance data with patient. Supplies and parts as needed for her machine. These are medically necessary. Limit caffeine use after 3pm.  Discussed adjusting humidity for congestion if needed.   4-6 month f/u

## 2021-01-18 NOTE — PROGRESS NOTES
Valentino Newcomer MD, Kasandra Settler, [facility]  Tiffanie Kehrt CNP Tennie Levan CNP [address]  Babak Mckeon [address] [facility], [address] [facility] [phone]   [facility] [address] Babak Mckeon. [address] [place] [address]. Lucy [address] [phone]     Video Visit- Follow up    Pursuant to the emergency declaration under the [address] [place] [address] [facility] authority and the [employer] Act, this Virtual  Visit was conducted, with patient's consent, to reduce the patient's risk of exposure to COVID-19 and provide continuity of care for an established patient. Services were provided through a video synchronous discussion virtually to substitute for in-person clinic visit. Patient was located in their home. Assessment/Plan:     1. Obstructive sleep apnea syndrome  Assessment & Plan:  New Problem - On Tx. Reviewed sleep study and download compliance data with patient. Supplies and parts as needed for her machine. These are medically necessary. Limit caffeine use after 3pm.  Discussed adjusting humidity for congestion if needed. 4-6 month f/u    2. Migraine without aura and without status migrainosus, not intractable  Assessment & Plan:  Chronic- Stable. Cont meds per PCP and other physicians. 3. Gastroesophageal reflux disease, unspecified whether esophagitis present  Assessment & Plan:  Chronic- Stable. Cont meds per PCP and other physicians. 4. Depression with anxiety  Assessment & Plan:  Chronic- Stable. Cont meds per PCP and other physicians. 5. Non morbid obesity, unspecified obesity type  Assessment & Plan:  Chronic-Stable. Encouraged her to work on weight loss through diet and exercise. The primary encounter diagnosis was Obstructive sleep apnea syndrome. Diagnoses of Migraine without aura and without status migrainosus, not intractable, Gastroesophageal reflux disease, unspecified whether esophagitis present, Depression with anxiety, and Non morbid obesity, unspecified obesity type were also pertinent to this visit. The chronic medical conditions listed are directly related to the primary diagnosis listed above. The management of the primary diagnosis affects the secondary diagnosis and vice versa. Subjective:     Patient ID: Paula Ramirez is a 48 y.o. female. Chief Complaint   Patient presents with    Sleep Apnea     Subjective   HPI:    Machine Modem/Download Info:  Compliance (hours/night): 5.9 hrs/night  Download AHI (/hour): 3.8 /HR  Average CPAP Pressure : 11.2 cmH2O APAP - Settings  Pressure Min: 6 cmH2O  Pressure Max: 16 cmH2O     Comfort Settings  Humidity Level (0-8): 2  Flex/EPR (0-3): 3       ALEXIA:  Insurance mandated HST on 11/13/20 showed RDI-33.5/hr and low sat 88%. Has bad habits that keeps her up at night. Having some congestion issues but has not increased humidity. Pressure feels good, not having SOB nor aerophagia. Sleeping better, waking with more energy.     [facility]    [place] - Total score: 10    Current Outpatient Medications   Medication Sig Dispense Refill    busPIRone (BUSPAR) 5 MG tablet Take 5 mg by mouth 2 times daily      lansoprazole (PREVACID) 30 MG delayed release capsule TAKE 1 CAPSULE BY MOUTH  DAILY 2 TIMES DAILY 180 capsule 1    spironolactone (ALDACTONE) 25 MG tablet TAKE 1 TABLET BY MOUTH TWO  TIMES DAILY 180 tablet 1    fluticasone (FLONASE) 50 MCG/ACT nasal spray 1 spray by Nasal route daily 3 Bottle 3    valACYclovir (VALTREX) 500 MG tablet TAKE ONE TABLET BY MOUTH TWICE A DAY FOR 5 DAYS 10 tablet 12    acyclovir (ZOVIRAX) 5 % ointment APPLY TOPICALLY EVERY 3  HOURS 90 g 3  LORATADINE PO Take 1 tablet by mouth as needed      Glucosamine 500 MG CAPS Take by mouth      therapeutic multivitamin-minerals (THERAGRAN-M) tablet Take 1 tablet by mouth daily.  VORTIoxetine (TRINTELLIX) 5 MG tablet Take 1 tablet by mouth daily (Patient not taking: Reported on 1/18/2021) 30 tablet 2     No current facility-administered medications for this visit.         Allergies as of 01/18/2021 - Review Complete 01/18/2021   Allergen Reaction Noted    Macrodantin [nitrofurantoin]  12/04/2010    Sulfa antibiotics Hives 02/20/2017         Electronically signed by Brice Osborn MD on 1/18/2021 at 1:47 PM

## 2021-01-23 ENCOUNTER — PATIENT MESSAGE (OUTPATIENT)
Dept: FAMILY MEDICINE CLINIC | Age: 56
End: 2021-01-23

## 2021-01-23 DIAGNOSIS — M79.10 MYALGIA: Primary | ICD-10-CM

## 2021-01-25 NOTE — TELEPHONE ENCOUNTER
From: Michelle Sauceda  To: Salome Cannon MD  Sent: 1/23/2021 9:12 PM EST  Subject: Non-Urgent Medical Question    Dr. Mihir De Los Santos, question about vitamin D levels and maybe magnesium. I've been super tired and craving a lot of sugar. I'm taking buspar 5 mg at night. I tried to take in the morning also but it made me too tired. Quick update I have the D&c scheduled for March 1st in [facility] with Dr Beni Rodriguez. they have to do blood work so should I just ask them to check those different vitamins or should I have you order something. ?  Thanks

## 2021-03-31 ENCOUNTER — PATIENT MESSAGE (OUTPATIENT)
Dept: FAMILY MEDICINE CLINIC | Age: 56
End: 2021-03-31

## 2021-03-31 DIAGNOSIS — Z00.00 WELL ADULT EXAM: Primary | ICD-10-CM

## 2021-04-01 NOTE — TELEPHONE ENCOUNTER
From: Jimbo Gavin  To: Theresa Dai MD  Sent: 3/31/2021  7:18 PM EDT  Subject: Non-Urgent Medical Question    Dr. Daryle Kugel, I know we talk sometime back about a vitamin D blood test and you ordered it I never went. I had a D& C and now they want to do another or hysterectomy. Anyway I thought they were going to do blood work so I didn't go get it. can you look at the date of my last blood work I'm thinking maybe we just need to do full blood work if [facility] due.  Thank you

## 2021-04-06 NOTE — TELEPHONE ENCOUNTER
Dr. Nelle Bernheim, I'm not sure if my last message sent I hope it did not. I just found [facility] on [address] so I will call them tomorrow and make sure that they have everything and then I'll make an appointment either this week or next week to go to get lab work Bebeto Pierce I want to check my vitamin D levels and it looks like I haven't had lab work in a while.  Thank you      Labs are in the system

## 2021-04-13 DIAGNOSIS — Z00.00 WELL ADULT EXAM: ICD-10-CM

## 2021-04-13 DIAGNOSIS — B00.9 HERPES SIMPLEX: ICD-10-CM

## 2021-04-13 LAB
A/G RATIO: 1.7 (ref 1.1–2.2)
ALBUMIN SERPL-MCNC: 4.2 G/DL (ref 3.4–5)
ALP BLD-CCNC: 81 U/L (ref 40–129)
ALT SERPL-CCNC: 34 U/L (ref 10–40)
ANION GAP SERPL CALCULATED.3IONS-SCNC: 11 MMOL/L (ref 3–16)
AST SERPL-CCNC: 26 U/L (ref 15–37)
BILIRUB SERPL-MCNC: 1 MG/DL (ref 0–1)
BUN BLDV-MCNC: 11 MG/DL (ref 7–20)
CALCIUM SERPL-MCNC: 9 MG/DL (ref 8.3–10.6)
CHLORIDE BLD-SCNC: 102 MMOL/L (ref 99–110)
CHOLESTEROL, TOTAL: 120 MG/DL (ref 0–199)
CO2: 24 MMOL/L (ref 21–32)
CREAT SERPL-MCNC: 0.7 MG/DL (ref 0.6–1.1)
GFR AFRICAN AMERICAN: >60
GFR NON-AFRICAN AMERICAN: >60
GLOBULIN: 2.5 G/DL
GLUCOSE BLD-MCNC: 98 MG/DL (ref 70–99)
HCT VFR BLD CALC: 38.5 % (ref 36–48)
HDLC SERPL-MCNC: 51 MG/DL (ref 40–60)
HEMOGLOBIN: 12.5 G/DL (ref 12–16)
LDL CHOLESTEROL CALCULATED: 56 MG/DL
MCH RBC QN AUTO: 26.6 PG (ref 26–34)
MCHC RBC AUTO-ENTMCNC: 32.4 G/DL (ref 31–36)
MCV RBC AUTO: 82.1 FL (ref 80–100)
PDW BLD-RTO: 15.8 % (ref 12.4–15.4)
PLATELET # BLD: 262 K/UL (ref 135–450)
PLATELET SLIDE REVIEW: ADEQUATE
PMV BLD AUTO: 9.4 FL (ref 5–10.5)
POTASSIUM SERPL-SCNC: 4 MMOL/L (ref 3.5–5.1)
RBC # BLD: 4.69 M/UL (ref 4–5.2)
SLIDE REVIEW: ABNORMAL
SODIUM BLD-SCNC: 137 MMOL/L (ref 136–145)
TOTAL PROTEIN: 6.7 G/DL (ref 6.4–8.2)
TRIGL SERPL-MCNC: 66 MG/DL (ref 0–150)
TSH REFLEX: 1.21 UIU/ML (ref 0.27–4.2)
VITAMIN D 25-HYDROXY: 37.9 NG/ML
VLDLC SERPL CALC-MCNC: 13 MG/DL
WBC # BLD: 8.1 K/UL (ref 4–11)

## 2021-04-13 RX ORDER — VALACYCLOVIR HYDROCHLORIDE 500 MG/1
500 TABLET, FILM COATED ORAL 2 TIMES DAILY
Qty: 10 TABLET | Refills: 12 | Status: SHIPPED | OUTPATIENT
Start: 2021-04-13

## 2021-04-13 RX ORDER — ACYCLOVIR 50 MG/G
OINTMENT TOPICAL
Qty: 90 G | Refills: 0 | Status: SHIPPED | OUTPATIENT
Start: 2021-04-13 | End: 2022-03-31

## 2021-04-13 NOTE — TELEPHONE ENCOUNTER
Requested Prescriptions     Pending Prescriptions Disp Refills    acyclovir (ZOVIRAX) 5 % ointment 90 g 3     Sig: Apply topically every 3 hours.      Last ov 09/02/2020  Last lab 06/25/20220    [facility] CMA

## 2021-06-07 ENCOUNTER — HOSPITAL ENCOUNTER (OUTPATIENT)
Age: 56
Discharge: HOME | End: 2021-06-07
Payer: COMMERCIAL

## 2021-06-07 ENCOUNTER — HOSPITAL ENCOUNTER (OUTPATIENT)
Dept: GENERAL RADIOLOGY | Age: 56
Discharge: HOME | End: 2021-06-07
Payer: COMMERCIAL

## 2021-06-07 DIAGNOSIS — N95.0 ABNORMAL VAGINAL BLEEDING IN POSTMENOPAUSAL PATIENT: ICD-10-CM

## 2021-06-07 PROCEDURE — 71046 X-RAY EXAM CHEST 2 VIEWS: CPT

## 2021-06-14 ENCOUNTER — OFFICE VISIT (OUTPATIENT)
Dept: FAMILY MEDICINE CLINIC | Age: 56
End: 2021-06-14
Payer: COMMERCIAL

## 2021-06-14 VITALS
BODY MASS INDEX: 41.69 KG/M2 | OXYGEN SATURATION: 98 % | HEART RATE: 77 BPM | SYSTOLIC BLOOD PRESSURE: 134 MMHG | RESPIRATION RATE: 14 BRPM | DIASTOLIC BLOOD PRESSURE: 82 MMHG | WEIGHT: 262.2 LBS

## 2021-06-14 DIAGNOSIS — G47.33 OBSTRUCTIVE SLEEP APNEA SYNDROME: ICD-10-CM

## 2021-06-14 DIAGNOSIS — J45.990 ASTHMA, EXERCISE INDUCED: ICD-10-CM

## 2021-06-14 DIAGNOSIS — Z01.818 PREOP EXAMINATION: Primary | ICD-10-CM

## 2021-06-14 DIAGNOSIS — N95.0 POST-MENOPAUSAL BLEEDING: ICD-10-CM

## 2021-06-14 PROCEDURE — 99215 OFFICE O/P EST HI 40 MIN: CPT | Performed by: FAMILY MEDICINE

## 2021-06-14 PROCEDURE — 93000 ELECTROCARDIOGRAM COMPLETE: CPT | Performed by: FAMILY MEDICINE

## 2021-06-14 RX ORDER — PANTOPRAZOLE SODIUM 40 MG/1
40 TABLET, DELAYED RELEASE ORAL
COMMUNITY
Start: 2021-03-31 | End: 2021-07-16 | Stop reason: SDUPTHER

## 2021-06-14 NOTE — PROGRESS NOTES
Alcohol use: No    Drug use: No    Sexual activity: Yes     Partners: Male   Other Topics Concern    Not on file   Social History Narrative    Not on file     Social Determinants of Health     Financial Resource Strain:     Difficulty of Paying Living Expenses:    Food Insecurity:     Worried About Running Out of Food in the Last Year:     [address] in the Last Year:    Transportation Needs:     Lack of Transportation (Medical):  Lack of Transportation (Non-Medical):    Physical Activity:     Days of Exercise per Week:     Minutes of Exercise per Session:    Stress:     Feeling of Stress :    Social Connections:     Frequency of Communication with Friends and Family:     Frequency of Social Gatherings with Friends and Family:     Attends Anabaptist Services:     Active Member of Clubs or Organizations:     Attends Club or Organization Meetings:     Marital Status:    Intimate Partner Violence:     Fear of Current or Ex-Partner:     Emotionally Abused:     Physically Abused:     Sexually Abused:        Review of Systems  A comprehensive review of systems was negative except for what was noted in the HPI. The patient denies cough, chest pain, dyspnea, wheezing or hemoptysis. Patient denies any exertional chest pain, dyspnea, palpitations, syncope, orthopnea, edema or paroxysmal nocturnal dyspnea. Physical Exam   Constitutional: She is oriented to person, place, and time. She appears well-developed and well-nourished. No distress. HENT:   Head: Normocephalic and atraumatic. Mouth/Throat: Uvula is midline, oropharynx is clear and moist and mucous membranes are normal.   Eyes: Conjunctivae and EOM are normal. Pupils are equal, round, and reactive to light. Neck: Trachea normal and normal range of motion. Neck supple. No JVD present. Carotid bruit is not present. No mass and no thyromegaly present.    Cardiovascular: Normal rate, regular rhythm, normal heart sounds and intact indicated  ACC/AHA indications for pre-operative beta-blocker use:    · Vascular surgery with history of postitive stress test  · Intermediate or high risk surgery with history of CAD   · Intermediate or high risk surgery with multiple clinical predictors of CAD- 2 of the following: history of compensated or prior heart failure, history of cerebrovascular disease, DM, or renal insufficiency    Routine administration of higher-dose, long-acting metoprolol in beta-blockernaïve patients on the day of surgery, and in the absence of dose titration is associated with an overall increase in mortality. Beta-blockers should be started days to weeks prior to surgery and titrated to pulse < 70.  4. Deep vein thrombosis prophylaxis: regimen to be chosen by surgical team  5.  No contraindications to planned surgery

## 2021-06-17 ENCOUNTER — VIRTUAL VISIT (OUTPATIENT)
Dept: PULMONOLOGY | Age: 56
End: 2021-06-17
Payer: COMMERCIAL

## 2021-06-17 DIAGNOSIS — J45.990 ASTHMA, EXERCISE INDUCED: ICD-10-CM

## 2021-06-17 DIAGNOSIS — E66.9 NON MORBID OBESITY, UNSPECIFIED OBESITY TYPE: Chronic | ICD-10-CM

## 2021-06-17 DIAGNOSIS — K21.00 GASTROESOPHAGEAL REFLUX DISEASE WITH ESOPHAGITIS WITHOUT HEMORRHAGE: Chronic | ICD-10-CM

## 2021-06-17 DIAGNOSIS — G47.33 OBSTRUCTIVE SLEEP APNEA SYNDROME: Primary | ICD-10-CM

## 2021-06-17 DIAGNOSIS — J30.1 ALLERGIC RHINITIS DUE TO POLLEN, UNSPECIFIED SEASONALITY: Chronic | ICD-10-CM

## 2021-06-17 PROCEDURE — 99214 OFFICE O/P EST MOD 30 MIN: CPT | Performed by: NURSE PRACTITIONER

## 2021-06-17 ASSESSMENT — SLEEP AND FATIGUE QUESTIONNAIRES
HOW LIKELY ARE YOU TO NOD OFF OR FALL ASLEEP WHILE SITTING INACTIVE IN A PUBLIC PLACE: 1
HOW LIKELY ARE YOU TO NOD OFF OR FALL ASLEEP WHILE SITTING AND READING: 3
ESS TOTAL SCORE: 14
HOW LIKELY ARE YOU TO NOD OFF OR FALL ASLEEP WHILE SITTING QUIETLY AFTER LUNCH WITHOUT ALCOHOL: 3
HOW LIKELY ARE YOU TO NOD OFF OR FALL ASLEEP WHILE SITTING AND TALKING TO SOMEONE: 0
HOW LIKELY ARE YOU TO NOD OFF OR FALL ASLEEP WHEN YOU ARE A PASSENGER IN A CAR FOR AN HOUR WITHOUT A BREAK: 2
HOW LIKELY ARE YOU TO NOD OFF OR FALL ASLEEP WHILE LYING DOWN TO REST IN THE AFTERNOON WHEN CIRCUMSTANCES PERMIT: 3
HOW LIKELY ARE YOU TO NOD OFF OR FALL ASLEEP WHILE WATCHING TV: 2
HOW LIKELY ARE YOU TO NOD OFF OR FALL ASLEEP IN A CAR, WHILE STOPPED FOR A FEW MINUTES IN TRAFFIC: 0

## 2021-06-17 NOTE — PROGRESS NOTES
21    Electronically signed by KATRIN Barton CNP on 2021 at 12:59 PM    Mahamed Harvey  1965  [facility] [id number] (home) [phone] (work)  [phone] (mobile)      Insurance Info (confirm with patient if correct):  Payor/Plan Subscr  Sex Relation Sub.  Ins. ID Effective Group Num

## 2021-06-17 NOTE — PROGRESS NOTES
Sherron Mary MD, FAASM, [facility]  Pia Altamirano, MSN, RN, CNP     [address] [facility]  [place] [address] [address] [address]  Dept: [phone]  Dept Fax: [phone]  Loc: [phone]    Subjective:     Patient ID: Suraj Lyn is a 55 y.o. female. Chief Complaint   Patient presents with    Sleep Apnea       HPI:      Sleep Medicine Video Visit    Pursuant to the emergency declaration under the [address] [place] [address], [facility] waiver authority and the [employer] Act this Telephone Visit was insisted, with patient's consent, to reduce the patient's risk of exposure to COVID-19 and provide continuity of care for an established patient. Services were provided through a synchronous discussion over a telephone and/or Video chat to substitute for in-person clinic visit, and coded as such. While patient is at home.     Machine Modem/Download Info:  Compliance (hours/night): 5.4 hrs/night  Download AHI (/hour): 3.3 /HR  Average CPAP Pressure : 10.7 cmH2O           APAP - Settings  Pressure Min: 6 cmH2O  Pressure Max: 16 cmH2O                 Comfort Settings  Humidity Level (0-8): 2  Flex/EPR (0-3): 3 PAP Mask  Mask Type: Full Face mask  Clinically Relevant Leak: No     [place] - Total score: 14    Follow-up :     Last Visit : January 2021      Subjective Health Changes: Hysterectomy schedule 6/2021     Over Night Oximetry: [] Yes  [] No  [x] NA [] WNL   Using O2: [] Yes  [] No  [x] NA   Patient is compliant with the machine  [x] Yes  [] No [] Per patient   Feeling rested when using the machine   [x] Yes  [] No     Pressure is comfortable with inspiration and expiration  [x] Yes  [] No   ([x] NA   [] Feeling of suffocation  [] Feeling like not enough air    [] To much pressure)     Noticed changes in pressure  [x] NA  [] Yes    [] No     Mask is fitting well  [x] Yes  [] No   Noting Mask Air Leak  [x] Yes  [] No   Having painful Aerophagia  [] Yes  [x] No   Nocturia   0-1  per night. Having  HA upon waking  [] Yes  [x] No   Dry mouth upon waking   Dry Nose  Dry Eyes  [x] Yes  [] No   Congestion upon waking   [] Yes  [x] No    Nose Bleeds  [] Yes  [x] No   Using Sleep Aides  [x] NA  [] OTC  [] Per our office   [] Per another provider   Understands how to change humidification and/or tubing temperature for comfort while at home  [x] Yes  [] No     Difficulties falling asleep  [] Yes  [x] No   Difficulties staying asleep  [] Yes  [x] No   Approximate time to bed  12:30-2:30am   Approximate wake time  6:30am   Taking Naps  frequent   If taking naps usual length  1+ hours     [] NA   If taking naps using the machine [] NA  [] Yes    [x] No    [] With and With out    Drowsy when driving  [] Yes  [x] No     Does patient carry a DOT/CDL  [] Yes  [x] No     Does patient carry FAA/[employer] License   [] Yes  [x] No      Any concerns noted with the machine at this time  [] Yes  [x] No       Assessment/Plan:   1. Obstructive sleep apnea syndrome  Assessment & Plan:  After downloading data and reviewing  Reviewed compliance download with pt. Supplies and parts as needed for his machine. These are medically necessary. Continue medications per his PCP and other physicians. Limit caffeine use after 3pm.    The chronic medical conditions listed are directly related to the primary diagnosis listed above. The management of the primary diagnosis affects the secondary diagnosis and vice versa    Patient is complaint encouraged to maintain compliance to aide on controlling other stated healthcare concerns. 2. Non morbid obesity, unspecified obesity type  Assessment & Plan:  Chronic- stable. After speaking with patient:    Agree with current plan, and would agree to continue this plan per prescribing and managing physician.       3. Gastroesophageal reflux disease with esophagitis without hemorrhage Assessment & Plan:  Chronic- stable. After speaking with patient:    Agree with current plan, and would agree to continue this plan per prescribing and managing physician. 4. Asthma, exercise induced  Assessment & Plan:  Chronic- stable. After speaking with patient:    Agree with current plan, and would agree to continue this plan per prescribing and managing physician. 5. Allergic rhinitis due to pollen, unspecified seasonality  Assessment & Plan:  Chronic- stable. After speaking with patient:    Agree with current plan, and would agree to continue this plan per prescribing and managing physician. - After pulling data and reviewing it   - Reviewed compliance download with patient    -Medically necessary supplies and parts as needed for her machine.   - Continue medications per his primary care provider and other physicians.   - Encouraged to limit caffeine use after 3pm.    - Encouraged her to work on weight loss through diet and exercise  - Educated not to drive when feeling sleepy   - Patient using [employer]  - Patient difficult to educate with patient being hyperverbal   Napping with the machine  Cleaning of mask, tubing, and water reservoir  Filter changes and cleaning  Fatigue versus Excessive Day Time Sleepiness   Sleep hygiene (primary goal to set a specific wake up time, and then work with bed time around 7-8 hours prior, but the body will work itself back)  Office locations  Machine function   Setting an alarm to encourage moving to the room with the machine for quality sleep  Compliance  Follow up  The risk of undercontrolling Obstructive sleep apnea  After speaking to the patient, patient is currently stable.  We will continue with the current machine settings  Anesthesia and Obstructive sleep apnea (altering sleep patterns)  The effect of anxiety, stress, and pain on sleep      Not able to give suggestions for sleep number bed     The chronic medical conditions listed are directly related to the primary diagnosis listed above. The management of the primary diagnosis affects the secondary diagnosis and vice versa.     - Will follow up in off in 6 months    Electronically signed by  RUKHSANA Daigle, RN, CNP on 6/17/2021 at 1:09 PM

## 2021-07-14 ENCOUNTER — TELEPHONE (OUTPATIENT)
Dept: FAMILY MEDICINE CLINIC | Age: 56
End: 2021-07-14

## 2021-07-14 NOTE — TELEPHONE ENCOUNTER
Patient called concerning a form she uploaded 12/2020 we didn't complete the  form because were unaware. I explained the situation to the patient. Patient understands. Patient gave me some information to put on form: Isaac Muñoz and claim  # [id number]. I placed folder on Dr. So Serrato desk. Please fax form and mail original to patient; per patient. Please advise. Thanks.        FAX # [phone]

## 2021-07-16 ENCOUNTER — TELEPHONE (OUTPATIENT)
Dept: FAMILY MEDICINE CLINIC | Age: 56
End: 2021-07-16

## 2021-07-16 RX ORDER — PANTOPRAZOLE SODIUM 40 MG/1
40 TABLET, DELAYED RELEASE ORAL
Qty: 180 TABLET | Refills: 1 | Status: SHIPPED | OUTPATIENT
Start: 2021-07-16 | End: 2021-10-19 | Stop reason: SDUPTHER

## 2021-07-16 NOTE — TELEPHONE ENCOUNTER
Meghan Vargas would like to know if Dr. Briseida Mendez can send a 90 script of pantoprazole 40 mg to [facility]. The medication was originally prescribed by her gastroenterologist, Dr. Shawna Vera. He recently moved to a new practice and she has not been able to schedule an appt with him. Please advise. Thanks.         Medication name: pantoprazole  Medication dose: 40 mg  Frequency: Take 40 mg by mouth 2 times daily (before meals)  Quantity: 90 day supply      Pharmacy name: [address] [place] [address]. [address] Shelly Lozano [address] [place] [address] [phone] [facility] [phone]      Last ov: 6/14/2021  Last labs: 4/13/2021

## 2021-07-19 NOTE — TELEPHONE ENCOUNTER
Signed form was scanned into the  and faxed to the number [phone] as requested. Original copy was mailed out as pt requested.      [address], [address] [address]

## 2021-10-18 ENCOUNTER — PATIENT MESSAGE (OUTPATIENT)
Dept: FAMILY MEDICINE CLINIC | Age: 56
End: 2021-10-18

## 2021-10-18 DIAGNOSIS — L70.0 ACNE VULGARIS: ICD-10-CM

## 2021-10-19 RX ORDER — PANTOPRAZOLE SODIUM 40 MG/1
40 TABLET, DELAYED RELEASE ORAL
Qty: 180 TABLET | Refills: 1 | Status: SHIPPED | OUTPATIENT
Start: 2021-10-19 | End: 2022-03-31

## 2021-10-19 RX ORDER — SPIRONOLACTONE 25 MG/1
TABLET ORAL
Qty: 180 TABLET | Refills: 1 | Status: SHIPPED | OUTPATIENT
Start: 2021-10-19

## 2021-10-19 NOTE — TELEPHONE ENCOUNTER
From: Dyana Shoemaker  To: Latonya Joshi MD  Sent: 10/18/2021 12:51 PM EDT  Subject: Prescription Question    Dr. Manan Tang, my insurance changed now medical [employer]. I will call office. 2 scripts needed thru [facility]. Both cheaper through [facility]. Please ignore facts from [facility]. Pantoprozole 40mg. 90-day supply if they give you two times a day. Also I'm not sure do you think I still need spiralactone because I had a hysterectomy Guess go ahead and call it in and I'll keep it to see if acne comes back. Probably need to talk about that one. Thank you.  Marcial Prakash

## 2021-10-20 ENCOUNTER — TELEPHONE (OUTPATIENT)
Dept: PULMONOLOGY | Age: 56
End: 2021-10-20

## 2021-10-20 NOTE — TELEPHONE ENCOUNTER
Dr. Noe Riojas,. Do you think I need to continue a spiralactone?    Thank you Sabas Mancini    Please advise  Thank you

## 2021-11-16 ENCOUNTER — TELEPHONE (OUTPATIENT)
Dept: FAMILY MEDICINE CLINIC | Age: 56
End: 2021-11-16

## 2021-11-21 NOTE — PROGRESS NOTES
Subjective:      Patient ID: Chivo Montemayor is a 55 y.o. female is here for her annual wellness exam.     HPI    PAP: Not indicated. S/P hysterectomy for DUP 6/2021. Mammogram: normal 3/8/21. FH negative for breast cancer  Colon cancer screening: colonoscopy normal in 2013. FH negative for colon cancer and polyps. 10 year follow up recommended. Vaccines:  Due flu, COVID booster. LABS:  Due HIV and hepatitis C screen. Weight up 20 lbs in the past year. Is not eating well. Sometimes does not eat much and when she does eat overeats. Is drinking pop. Just started back on Viibryd from psychiatry. Is using her CPAP; still has daytime fatigue. Naps frequently. Retired from work. Gets up to get her granddter to work and then goes back to bed often. Is not seeing a therapist.   Is not doing much exercise; gets flare in fibromyalgia if overdoes exercise. occ rides exercise bike. Can do 15 minutes without any problem. occ hikes or shoots basketball with her granddaughter. The ASCVD Risk score (Akiko Thompson., et al., 2013) failed to calculate for the following reasons:     The valid total cholesterol range is 130 to 320 mg/dL     Health Maintenance   Topic Date Due    Hepatitis C screen  Never done    Pneumococcal 0-64 years Vaccine (1 of 2 - PPSV23) Never done    HIV screen  Never done    Flu vaccine (1) 09/01/2021    COVID-19 Vaccine (3 - Booster for Pfizer series) 10/14/2021    Potassium monitoring  06/07/2022    Creatinine monitoring  06/07/2022    Breast cancer screen  03/08/2023    Colon cancer screen colonoscopy  09/16/2023    Lipid screen  04/13/2026    DTaP/Tdap/Td vaccine (3 - Td or Tdap) 07/13/2028    Shingles Vaccine  Completed    Hepatitis A vaccine  Aged Out    Hepatitis B vaccine  Aged Out    Hib vaccine  Aged Out    Meningococcal (ACWY) vaccine  Aged Out           Outpatient Medications Marked as Taking for the 11/23/21 encounter (Office Visit) with Darleene Goodpasture, MD Medication Sig Dispense Refill    vilazodone HCl (VILAZODONE HCL) 10 MG TABS Take 10 mg by mouth daily      pantoprazole (PROTONIX) 40 MG tablet Take 1 tablet by mouth 2 times daily (before meals) 180 tablet 1    spironolactone (ALDACTONE) 25 MG tablet TAKE 1 TABLET BY MOUTH TWO  TIMES DAILY 180 tablet 1    valACYclovir (VALTREX) 500 MG tablet Take 1 tablet by mouth 2 times daily 10 tablet 12    acyclovir (ZOVIRAX) 5 % ointment Apply topically every 3 hours. 90 g 0    busPIRone (BUSPAR) 5 MG tablet Take 15 mg by mouth nightly       fluticasone (FLONASE) 50 MCG/ACT nasal spray 1 spray by Nasal route daily 3 Bottle 3    LORATADINE PO Take 1 tablet by mouth as needed      therapeutic multivitamin-minerals (THERAGRAN-M) tablet Take 1 tablet by mouth daily.            Allergies   Allergen Reactions    Macrodantin [Nitrofurantoin]     Sulfa Antibiotics Hives       Patient Active Problem List   Diagnosis    Irritable bowel syndrome    Esophageal reflux    Migraine without aura    Allergic rhinitis    Asthma, exercise induced    ADD (attention deficit disorder)    Herpes simplex    Fibromyalgia    Depression with anxiety    Non morbid obesity, unspecified obesity type    Obstructive sleep apnea syndrome        Past Medical History:   Diagnosis Date    Benign neoplasm of breast     Chondromalacia of patellofemoral joint 9/2/2015    Elevated liver enzymes 4/19/2013    Obstructive sleep apnea syndrome 1/18/2021    Patellofemoral dysfunction 8/14/2012    Personal history of cervical dysplasia     Pilonidal cyst without mention of abscess     Sciatica of right side 11/28/2011    TMJ arthropathy 3/14/2014    Trochanteric bursitis of right hip 9/2/2015    Urethral stricture unspecified        Past Surgical History:   Procedure Laterality Date    ARTHROPLASTY      temporomandibular joint    ENDOMETRIAL ABLATION  98/3914    Dr. Srinivasan LEAL      cervical conization    NASAL SEPTUM SURGERY  POLYPECTOMY      nasal endoscopy    SINUS SURGERY  03/2017    Endoscopic sinus surgery    ONELIA AND BSO Bilateral 06/22/2021    DUB    TONSILLECTOMY         Social History     Tobacco Use    Smoking status: Never Smoker    Smokeless tobacco: Never Used   Vaping Use    Vaping Use: Never used   Substance Use Topics    Alcohol use: No    Drug use: No       Family History   Problem Relation Age of Onset    Cancer Mother         skin cancer    Other Father         PVD    Other Brother         a&w    Hypertension Paternal Grandmother     Other Paternal Grandfather         myeloma       Review of Systems  Review of Systems   Constitutional: Negative for activity change, appetite change, fatigue, fever and unexpected weight change. HENT: Positive for rhinorrhea. Negative for congestion, hearing loss, nosebleeds, sore throat, tinnitus, trouble swallowing and voice change. Allergies controlled with Claritin and Mucinex   Eyes: Negative for visual disturbance. Respiratory: Positive for shortness of breath. Negative for cough, choking, chest tightness and wheezing. Dyspnea if bends over, thinks weight related   Cardiovascular: Negative for chest pain, palpitations and leg swelling. Gastrointestinal: Positive for diarrhea. Negative for abdominal pain, anal bleeding, blood in stool, constipation and nausea. Metamucil is effective. Genitourinary: Negative for dysuria, flank pain, frequency, hematuria, pelvic pain, urgency, vaginal bleeding and vaginal discharge. Musculoskeletal: Positive for myalgias. Negative for arthralgias and back pain. Fibromyalgia. Skin: Negative for color change and rash. Allergic/Immunologic: Negative for environmental allergies. Neurological: Positive for headaches. Negative for weakness and light-headedness. Occ mild migraine. Excedrin is effective. Hematological: Does not bruise/bleed easily.    Psychiatric/Behavioral: Positive for sleep disturbance. Negative for dysphoric mood and suicidal ideas. The patient is nervous/anxious. Lab Results   Component Value Date     06/07/2021    K 4.4 06/07/2021     06/07/2021    CO2 19 (L) 06/07/2021    BUN 9 06/07/2021    CREATININE 0.7 06/07/2021    GLUCOSE 95 06/07/2021    CALCIUM 9.4 06/07/2021    PROT 6.7 04/13/2021    LABALBU 4.2 04/13/2021    BILITOT 1.0 04/13/2021    ALKPHOS 81 04/13/2021    AST 26 04/13/2021    ALT 34 04/13/2021    LABGLOM >60 06/07/2021    GFRAA >60 06/07/2021    AGRATIO 1.7 04/13/2021    GLOB 2.5 04/13/2021        Lab Results   Component Value Date    WBC 8.9 06/07/2021    HGB 13.0 06/07/2021    HCT 39.3 06/07/2021    MCV 80.6 06/07/2021     06/07/2021     Lab Results   Component Value Date    CHOL 120 04/13/2021    CHOL 161 11/15/2019    CHOL 144 01/26/2018     Lab Results   Component Value Date    TRIG 66 04/13/2021    TRIG 107 11/15/2019    TRIG 96 01/26/2018     Lab Results   Component Value Date    HDL 51 04/13/2021    HDL 67 (H) 11/15/2019    HDL 60 01/26/2018     Lab Results   Component Value Date    LDLCALC 56 04/13/2021    LDLCALC 73 11/15/2019    LDLCALC 65 01/26/2018     Lab Results   Component Value Date    LABVLDL 13 04/13/2021    LABVLDL 21 11/15/2019    LABVLDL 19 01/26/2018     No results found for: [facility]  Lab Results   Component Value Date    TSH 1.57 04/19/2013     Lab Results   Component Value Date    LABA1C 5.6 09/13/2014     Lab Results   Component Value Date    .0 09/13/2014      Objective:   Physical Exam  .  Vitals:    11/23/21 1013   BP: 134/76   Pulse: 82   Resp: 16   Temp: 97.1 °F (36.2 °C)   SpO2: 98%     Wt Readings from Last 3 Encounters:   11/23/21 271 lb 12.8 oz (123.3 kg)   06/14/21 262 lb 3.2 oz (118.9 kg)   11/02/20 252 lb (114.3 kg)        Physical Exam  Constitutional:       Appearance: Normal appearance. She is well-developed. HENT:      Head: Normocephalic and atraumatic.       Right Ear: Hearing, tympanic membrane, ear canal and external ear normal.      Left Ear: Hearing, tympanic membrane, ear canal and external ear normal.      Nose: Nose normal.      Mouth/Throat:      Pharynx: Uvula midline. Eyes:      General: Lids are normal.      Conjunctiva/sclera: Conjunctivae normal.      Pupils: Pupils are equal, round, and reactive to light. Funduscopic exam:     Right eye: No hemorrhage, AV nicking or arteriolar narrowing. Left eye: No hemorrhage, AV nicking or arteriolar narrowing. Neck:      Thyroid: No thyroid mass or thyromegaly. Vascular: No carotid bruit or JVD. Trachea: Trachea normal.   Cardiovascular:      Rate and Rhythm: Normal rate and regular rhythm. Pulses:           Carotid pulses are 2+ on the right side and 2+ on the left side. Femoral pulses are 2+ on the right side and 2+ on the left side. Dorsalis pedis pulses are 2+ on the right side and 2+ on the left side. Posterior tibial pulses are 2+ on the right side and 2+ on the left side. Heart sounds: Normal heart sounds. No murmur heard. Pulmonary:      Effort: Pulmonary effort is normal. No respiratory distress. Breath sounds: Normal breath sounds. Chest:   Breasts: Breasts are symmetrical.      Right: No inverted nipple, mass, nipple discharge, skin change, tenderness or supraclavicular adenopathy. Left: No inverted nipple, mass, nipple discharge, skin change, tenderness or supraclavicular adenopathy. Abdominal:      General: Bowel sounds are normal.      Tenderness: There is no abdominal tenderness. Hernia: No hernia is present. Musculoskeletal:         General: Normal range of motion. Cervical back: Neck supple. Lymphadenopathy:      Head:      Right side of head: No submental or submandibular adenopathy. Left side of head: No submental or submandibular adenopathy. Cervical: No cervical adenopathy.       Upper Body:      Right upper body: No supraclavicular adenopathy. Left upper body: No supraclavicular adenopathy. Skin:     General: Skin is warm and dry. Findings: No bruising, lesion or rash. Neurological:      Mental Status: She is alert. Deep Tendon Reflexes: Reflexes are normal and symmetric. Reflex Scores:       Patellar reflexes are 2+ on the right side and 2+ on the left side. Psychiatric:         Attention and Perception: Attention normal.         Mood and Affect: Mood is anxious and depressed. Affect is not inappropriate. Speech: Speech normal.         Behavior: Behavior normal.         Thought Content: Thought content normal. Thought content does not include suicidal ideation. Cognition and Memory: Cognition and memory normal.         Judgment: Judgment normal.           Assessment and Plan       Diagnosis Orders   1. Well adult exam  Discussed diet, exercise   Calcium and Vitamin D addressed  PAP Not indicated; Annual to biannual mammogram  Annual influenza vaccine  Dt every 10 years  Colonoscopy every 10 years until age 77  Dexa scan every 3 to 5 years     2. Class 3 severe obesity due to excess calories with serious comorbidity and body mass index (BMI) of 40.0 to 44.9 in adult [facility])  Lexii London DO, Bariatric Surgery, [facility]  Discussed diet, exercise and weight loss strategies   Recommend seeing Dr. Dana Sanchez.    3. Obstructive sleep apnea syndrome  Lexii London DO, Bariatric Surgery [facility]   4. Depression with anxiety  Continue with psychiatry.

## 2021-11-23 ENCOUNTER — OFFICE VISIT (OUTPATIENT)
Dept: FAMILY MEDICINE CLINIC | Age: 56
End: 2021-11-23
Payer: COMMERCIAL

## 2021-11-23 ENCOUNTER — PATIENT MESSAGE (OUTPATIENT)
Dept: FAMILY MEDICINE CLINIC | Age: 56
End: 2021-11-23

## 2021-11-23 VITALS
DIASTOLIC BLOOD PRESSURE: 76 MMHG | SYSTOLIC BLOOD PRESSURE: 134 MMHG | BODY MASS INDEX: 43.68 KG/M2 | TEMPERATURE: 97.1 F | HEART RATE: 82 BPM | OXYGEN SATURATION: 98 % | HEIGHT: 66 IN | WEIGHT: 271.8 LBS | RESPIRATION RATE: 16 BRPM

## 2021-11-23 DIAGNOSIS — Z23 FLU VACCINE NEED: ICD-10-CM

## 2021-11-23 DIAGNOSIS — F41.8 DEPRESSION WITH ANXIETY: ICD-10-CM

## 2021-11-23 DIAGNOSIS — E66.01 CLASS 3 SEVERE OBESITY DUE TO EXCESS CALORIES WITH SERIOUS COMORBIDITY AND BODY MASS INDEX (BMI) OF 40.0 TO 44.9 IN ADULT (HCC): ICD-10-CM

## 2021-11-23 DIAGNOSIS — G47.33 OBSTRUCTIVE SLEEP APNEA SYNDROME: ICD-10-CM

## 2021-11-23 DIAGNOSIS — Z00.00 WELL ADULT EXAM: Primary | ICD-10-CM

## 2021-11-23 PROCEDURE — G8484 FLU IMMUNIZE NO ADMIN: HCPCS | Performed by: FAMILY MEDICINE

## 2021-11-23 PROCEDURE — 90674 CCIIV4 VAC NO PRSV 0.5 ML IM: CPT | Performed by: FAMILY MEDICINE

## 2021-11-23 PROCEDURE — 99396 PREV VISIT EST AGE 40-64: CPT | Performed by: FAMILY MEDICINE

## 2021-11-23 PROCEDURE — 90471 IMMUNIZATION ADMIN: CPT | Performed by: FAMILY MEDICINE

## 2021-11-23 RX ORDER — VILAZODONE HYDROCHLORIDE 10 MG/1
10 TABLET ORAL DAILY
COMMUNITY

## 2021-11-23 ASSESSMENT — ENCOUNTER SYMPTOMS
COLOR CHANGE: 0
BLOOD IN STOOL: 0
COUGH: 0
DIARRHEA: 1
BACK PAIN: 0
ANAL BLEEDING: 0
SORE THROAT: 0
VOICE CHANGE: 0
SHORTNESS OF BREATH: 1
RHINORRHEA: 1
CHEST TIGHTNESS: 0
CHOKING: 0
NAUSEA: 0
TROUBLE SWALLOWING: 0
CONSTIPATION: 0
WHEEZING: 0
ABDOMINAL PAIN: 0

## 2021-12-16 ENCOUNTER — VIRTUAL VISIT (OUTPATIENT)
Dept: PULMONOLOGY | Age: 56
End: 2021-12-16
Payer: COMMERCIAL

## 2021-12-16 DIAGNOSIS — J45.990 ASTHMA, EXERCISE INDUCED: ICD-10-CM

## 2021-12-16 DIAGNOSIS — G47.33 OBSTRUCTIVE SLEEP APNEA SYNDROME: Primary | ICD-10-CM

## 2021-12-16 DIAGNOSIS — K21.00 GASTROESOPHAGEAL REFLUX DISEASE WITH ESOPHAGITIS WITHOUT HEMORRHAGE: Chronic | ICD-10-CM

## 2021-12-16 DIAGNOSIS — J30.1 ALLERGIC RHINITIS DUE TO POLLEN, UNSPECIFIED SEASONALITY: Chronic | ICD-10-CM

## 2021-12-16 DIAGNOSIS — E66.9 NON MORBID OBESITY, UNSPECIFIED OBESITY TYPE: Chronic | ICD-10-CM

## 2021-12-16 PROCEDURE — 99443 PR PHYS/QHP TELEPHONE EVALUATION 21-30 MIN: CPT | Performed by: NURSE PRACTITIONER

## 2021-12-16 ASSESSMENT — SLEEP AND FATIGUE QUESTIONNAIRES
HOW LIKELY ARE YOU TO NOD OFF OR FALL ASLEEP WHILE SITTING AND READING: 2
HOW LIKELY ARE YOU TO NOD OFF OR FALL ASLEEP IN A CAR, WHILE STOPPED FOR A FEW MINUTES IN TRAFFIC: 0
HOW LIKELY ARE YOU TO NOD OFF OR FALL ASLEEP WHILE WATCHING TV: 3
HOW LIKELY ARE YOU TO NOD OFF OR FALL ASLEEP WHEN YOU ARE A PASSENGER IN A CAR FOR AN HOUR WITHOUT A BREAK: 2
HOW LIKELY ARE YOU TO NOD OFF OR FALL ASLEEP WHILE LYING DOWN TO REST IN THE AFTERNOON WHEN CIRCUMSTANCES PERMIT: 3
HOW LIKELY ARE YOU TO NOD OFF OR FALL ASLEEP WHILE SITTING QUIETLY AFTER LUNCH WITHOUT ALCOHOL: 1
HOW LIKELY ARE YOU TO NOD OFF OR FALL ASLEEP WHILE SITTING INACTIVE IN A PUBLIC PLACE: 0
ESS TOTAL SCORE: 11
HOW LIKELY ARE YOU TO NOD OFF OR FALL ASLEEP WHILE SITTING AND TALKING TO SOMEONE: 0

## 2021-12-16 NOTE — PROGRESS NOTES
Shawnee Barton MD, FAASM, [facility]  Ross Loaiza, MSN, RN, [id number] [address] [address]  Dept: [phone]  Dept Fax: [phone]  Loc: [phone]    Subjective:     Patient ID: Gold Robbins is a 55 y.o. female. Chief Complaint   Patient presents with    Sleep Apnea       HPI:      Sleep Medicine Telephone Visit    Pursuant to the emergency declaration under the [address] [place] [address] waiver authority and the [employer] Act this Telephone Visit was insisted, with patient's consent, to reduce the patient's risk of exposure to COVID-19 and provide continuity of care for an established patient. Services were provided through a synchronous discussion over a telephone chat to substitute for in-person clinic visit, and coded as such. While patient is at home.     Machine Modem/Download Info:  Compliance (hours/night): 4.3 hrs/night  Download AHI (/hour): 2.8 /HR  Average CPAP Pressure : 11.5 cmH2O           APAP - Settings  Pressure Min: 6 cmH2O  Pressure Max: 16 cmH2O                 Comfort Settings  Humidity Level (0-8): 2  Flex/EPR (0-3): 3 PAP Mask  Mask Type: Full Face mask  Clinically Relevant Leak: No     [place] - Total score: 11    Follow-up :     Last Visit : June 2021      Subjective Health Changes: change in anxiety medications      Over Night Oximetry: [] Yes  [] No  [x] NA [] WNL   Using O2: [] Yes  [] No  [x] NA   Patient is compliant with the machine  [] Yes  [x] No [] Per patient   Feeling rested when using the machine   [x] Yes  [] No     Pressure is comfortable with inspiration and expiration  [x] Yes  [] No   ([x] NA   [] Feeling of suffocation  [] Feeling like not enough air    [] To much pressure)     Noticed changes in pressure  [x] NA  [] Yes    [] No     Mask is fitting well  [x] Yes  [] No   Noting Mask Air Leak [] Yes  [x] No   Having painful Aerophagia  [] Yes  [x] No   Nocturia   0-1  per night. Having  HA upon waking  [] Yes  [x] No   Dry mouth upon waking   Dry Nose  Dry Eyes  [x] Yes  [] No   Congestion upon waking   [] Yes  [x] No    Nose Bleeds  [] Yes  [x] No   Using Sleep Aides  [x] NA  [] OTC  [] Per our office   [] Per another provider   Understands how to change humidification and/or tubing temperature for comfort while at home  [x] Yes  [] No     Difficulties falling asleep  [] Yes  [x] No   Difficulties staying asleep  [] Yes  [x] No   Approximate time to bed  12-2am   Approximate wake time  7:30am   Taking Naps  frequent   If taking naps usual length  hours    If taking naps using the machine [] NA  [] Yes    [x] No    [] With and With out    Drowsy when driving  [] Yes  [x] No     Does patient carry a DOT/CDL  [] Yes  [x] No     Does patient carry FAA/[employer] License   [] Yes  [x] No      Any concerns noted with the machine at this time  [] Yes  [x] No       Assessment/Plan:     1. Obstructive sleep apnea syndrome  Assessment & Plan:  After downloading data and reviewing  Reviewed compliance download with pt. Supplies and parts as needed for his machine. These are medically necessary. Continue medications per his PCP and other physicians. Limit caffeine use after 3pm.    The chronic medical conditions listed are directly related to the primary diagnosis listed above. The management of the primary diagnosis affects the secondary diagnosis and vice versa    Patient is NOT compliant at this time. Increasing the risk of heart attacks, strokes, car accidents, and/or death from uncontrolled Obstructive Sleep Apnea      2. Gastroesophageal reflux disease with esophagitis without hemorrhage  Assessment & Plan:  Chronic- stable. After speaking with patient:    Agree with current plan, and would agree to continue this plan per prescribing and managing physician.       3. Non morbid obesity, unspecified obesity type  Assessment & Plan:  Patient encouraged to work on maintaining a healthy weight per height. Achievable with diet restriction/modifications and exercise (may consult primary care if unsure of any restrictions or concerns). Weight management directly correlates to risk of control and maintenance of Obstructive Sleep Apnea. 4. Allergic rhinitis due to pollen, unspecified seasonality  Assessment & Plan:  Chronic- stable. After speaking with patient:    Agree with current plan, and would agree to continue this plan per prescribing and managing physician. 5. Asthma, exercise induced  Assessment & Plan:  Chronic- stable. After speaking with patient:    Agree with current plan, and would agree to continue this plan per prescribing and managing physician. The chronic medical conditions listed are directly related to the primary diagnosis listed above. The management of the primary diagnosis affects the secondary diagnosis and vice versa. - After pulling data and reviewing it   - Educated patient and reviewed down load from modem with the patient   - Continue medications per her PCP and other physicians.   - she instructed not to drive unless had 4 hrs of effective therapy for her ALEXIA the night before. - Did review the risks of under or untreated ALEXIA including, but not limited to, higher risks of motor vehicle accidents, stroke, heart attacks, and death. - she understands and accepts all these risks. - The patient is advised to use the machine every night.   - Patient using  [employer] for supplies  - Patient educated on   [address] [place] [address] locations  Compliance  Follow up  The risk of undercontrolling Obstructive sleep apnea  After speaking to the patient, patient is currently stable.  We will continue with the current machine settings  Recall Information and DME contact    - Time spent with patient 24 to include time preparing same day as appointment   -Will follow up in office in 12 months      Electronically signed by KATRIN Dave CNP on 12/16/2021 at 1:29 PM

## 2021-12-16 NOTE — PROGRESS NOTES
Patsy Mathur         : 1965    Diagnosis: [x] ALEXIA (G47.33) [] CSA (G47.31) [] Apnea (G47.30)   Length of Need: [x] 12 Months [] 99 Months [] Other:    Machine (JESSY!): [] Respironics Dream Station   2   Auto [] ResMed AirSense     Auto [] Other:     []  CPAP () [] Bilevel ()   Mode: [] Auto [] Spontaneous    Mode: [] Auto [] Spontaneous            Comfort Settings:   - Ramp Pressure:  cmH2O                                        - Ramp time: 15 min                                     -  Flex/EPR - 3 full time                                    - For ResMed Bilevel (TiMax-4 sec   TiMin- 0.2 sec)     Humidifier: [x] Heated ()        [x] Water chamber replacement ()/ 1 per 6 months        Mask:   [] Nasal () /1 per 3 months [x] Full Face () /1 per 3 months   [] Patient choice -Size and fit mask [x] Patient Choice - Size and fit mask   [] Dispense:  [] Dispense:    [] Headgear () / 1 per 3 months [x] Headgear () / 1 per 3 months   [] Replacement Nasal Cushion ()/2 per month [x] Interface Replacement ()/1 per month   [] Replacement Nasal Pillows ()/2 per month         Tubing: [x] Heated ()/1 per 3 months    [] Standard ()/1 per 3 months [] Other:           Filters: [x] Non-disposable ()/1 per 6 months     [x] Ultra-Fine, Disposable ()/2 per month        Miscellaneous: [] Chin Strap ()/ 1 per 6 months [] O2 bleed-in:       LPM   [] Oximetry on CPAP/Bilevel []  Other:    [x] Modem: ()         Start Order Date: 21    MEDICAL JUSTIFICATION:  I, the undersigned, certify that the above prescribed supplies are medically necessary for this patients wellbeing. In my opinion, the supplies are both reasonable and necessary in reference to accepted standards of medicalpractice in treatment of this patients condition.     KATRIN Lucas - CNP      NPI: [id number]       Order Signed Date: 21    Electronically signed by Karen Islas, APRN - CNP on 2021 at 1:33 PM    Tr Anderson  1965  [facility] [id number] (home) [phone] (work)  [phone] (mobile)      Insurance Info (confirm with patient if correct):  Payor/Plan Subscr  Sex Relation Sub. Ins. ID Effective Group Num   1. [address] [facility] 1965 Female  [id number] Not Eff [id number]                                   [address] [address]   2.  [address] 1965 Female  [id number] Not Eff [id number]                                   [address]

## 2021-12-21 ENCOUNTER — PATIENT MESSAGE (OUTPATIENT)
Dept: FAMILY MEDICINE CLINIC | Age: 56
End: 2021-12-21

## 2022-01-05 ENCOUNTER — PATIENT MESSAGE (OUTPATIENT)
Dept: FAMILY MEDICINE CLINIC | Age: 57
End: 2022-01-05

## 2022-01-11 ENCOUNTER — VIRTUAL VISIT (OUTPATIENT)
Dept: FAMILY MEDICINE CLINIC | Age: 57
End: 2022-01-11
Payer: COMMERCIAL

## 2022-01-11 DIAGNOSIS — J45.990 EXERCISE-INDUCED ASTHMA: ICD-10-CM

## 2022-01-11 DIAGNOSIS — R51.9 NONINTRACTABLE HEADACHE, UNSPECIFIED CHRONICITY PATTERN, UNSPECIFIED HEADACHE TYPE: Primary | ICD-10-CM

## 2022-01-11 DIAGNOSIS — R53.83 FATIGUE, UNSPECIFIED TYPE: ICD-10-CM

## 2022-01-11 DIAGNOSIS — R05.9 COUGH: ICD-10-CM

## 2022-01-11 PROCEDURE — G8427 DOCREV CUR MEDS BY ELIG CLIN: HCPCS | Performed by: FAMILY MEDICINE

## 2022-01-11 PROCEDURE — 3017F COLORECTAL CA SCREEN DOC REV: CPT | Performed by: FAMILY MEDICINE

## 2022-01-11 PROCEDURE — 99214 OFFICE O/P EST MOD 30 MIN: CPT | Performed by: FAMILY MEDICINE

## 2022-01-11 RX ORDER — PROMETHAZINE HYDROCHLORIDE AND CODEINE PHOSPHATE 6.25; 1 MG/5ML; MG/5ML
5 SYRUP ORAL 4 TIMES DAILY PRN
Qty: 118 ML | Refills: 0 | Status: SHIPPED | OUTPATIENT
Start: 2022-01-11 | End: 2022-01-18

## 2022-01-11 NOTE — PROGRESS NOTES
2022    TELEHEALTH EVALUATION -- Audio/Visual (During OMEUP-22 public health emergency)    Due to Gemma 19 outbreak, patient's office visit was converted to a virtual visit. Patient was contacted and agreed to proceed with a virtual visit via Doxy. me  The risks and benefits of converting to a virtual visit were discussed in light of the current infectious disease epidemic. Patient also understood that insurance coverage and co-pays are up to their individual insurance plans. HPI:    Annamaria Jay (:  1965) has requested an audio/video evaluation for the following concern(s):    Fatigue and cough X 4-5 days. Used Albuterol last night. Headaches. No fever or sore throat . No nausea, vomiting, diarrhea . No loss of taste / smell. No nasal congestion. Some post nasal drip - yellow. Using CPAP machine. Sleeping difficulties the first 3 nights or so. Struggling with sleep. Vaccinated for COVID , but not booster. Last COVID test was negative 1 week before [place]. Occasional wheezing . No shortness of breath . H/o exercise induced asthma, which she often needs Qvar for 2 weeks with URI . She uses Albuterol prn. Her family had symptoms over the weekend / end of the week- emesis and diarrhea. No fever . Lasted for <36 hours. Son , wife and 2 babies (1.6 yo, 2.6 yo), who moved back home. She never got the stomach symptoms. H/o allergies to dust and mold. Son brought 2 dogs and 1 cat. Review of Systems    Prior to Visit Medications    Medication Sig Taking?  Authorizing Provider   vilazodone HCl (VILAZODONE HCL) 10 MG TABS Take 10 mg by mouth daily  Historical Provider, MD   pantoprazole (PROTONIX) 40 MG tablet Take 1 tablet by mouth 2 times daily (before meals)  Alexa Young MD   spironolactone (ALDACTONE) 25 MG tablet TAKE 1 TABLET BY MOUTH TWO  TIMES DAILY  Alexa Young MD   valACYclovir (VALTREX) 500 MG tablet Take 1 tablet by mouth 2 times daily  Alexa Young MD acyclovir (ZOVIRAX) 5 % ointment Apply topically every 3 hours. Neha Wilkins MD   busPIRone (BUSPAR) 5 MG tablet Take 15 mg by mouth nightly   Historical Provider, MD   fluticasone (FLONASE) 50 MCG/ACT nasal spray 1 spray by Nasal route daily  Darylene Inks, MD   LORATADINE PO Take 1 tablet by mouth as needed  Historical Provider, MD   therapeutic multivitamin-minerals (THERAGRAN-M) tablet Take 1 tablet by mouth daily.     Historical Provider, MD       Allergies   Allergen Reactions    Macrodantin [Nitrofurantoin]     Sulfa Antibiotics Hives       Social History     Tobacco Use    Smoking status: Never Smoker    Smokeless tobacco: Never Used   Vaping Use    Vaping Use: Never used   Substance Use Topics    Alcohol use: No    Drug use: No        Past Medical History:   Diagnosis Date    Benign neoplasm of breast     Chondromalacia of patellofemoral joint 9/2/2015    Elevated liver enzymes 4/19/2013    Obstructive sleep apnea syndrome 1/18/2021    Patellofemoral dysfunction 8/14/2012    Personal history of cervical dysplasia     Pilonidal cyst without mention of abscess     Sciatica of right side 11/28/2011    TMJ arthropathy 3/14/2014    Trochanteric bursitis of right hip 9/2/2015    Urethral stricture unspecified        Past Surgical History:   Procedure Laterality Date    ARTHROPLASTY      temporomandibular joint    ENDOMETRIAL ABLATION  56/6382    Dr. Pal Graft LEEP      cervical conization    NASAL SEPTUM SURGERY      POLYPECTOMY      nasal endoscopy    SINUS SURGERY  03/2017    Endoscopic sinus surgery    ONELIA AND BSO Bilateral 06/22/2021    DUB    TONSILLECTOMY         Health Maintenance   Topic Date Due    Hepatitis C screen  Never done    Pneumococcal 0-64 years Vaccine (1 of 2 - PPSV23) Never done    Depression Monitoring  Never done    COVID-19 Vaccine (3 - Booster for Pfizer series) 10/14/2021    Potassium monitoring  06/07/2022    Creatinine monitoring  06/07/2022  Breast cancer screen  03/08/2023    Colon cancer screen colonoscopy  09/16/2023    Lipid screen  04/13/2026    DTaP/Tdap/Td vaccine (3 - Td or Tdap) 07/13/2028    Flu vaccine  Completed    Shingles Vaccine  Completed    HIV screen  Completed    Hepatitis A vaccine  Aged Out    Hepatitis B vaccine  Aged Out    Hib vaccine  Aged Out    Meningococcal (ACWY) vaccine  Aged Out       Family History   Problem Relation Age of Onset    Cancer Mother         skin cancer    Other Father         PVD    Other Brother         a&w    Hypertension Paternal Grandmother     Other Paternal Grandfather         myeloma       PHYSICAL EXAMINATION:    Vital Signs: (As obtained by patient/caregiver or practitioner observation)     Patient-Reported Vitals 1/11/2022   Patient-Reported Weight 270   Patient-Reported Height 5'6\"   Patient-Reported Systolic 920   Patient-Reported Diastolic 80   Patient-Reported Pulse Na   Patient-Reported Temperature 98.5      Respiratory rate= 14     Constitutional:  Appears well-developed and well-nourished. No apparent distress                              Mental status:  Alert and awake. Oriented to person/place/time. Able to follow commands       Eyes: EOM intact. Sclera-normal. No erythema of conjunctiva. No eye discharge. HENT: Normocephalic, atraumatic. Mouth/Throat: normal. Mucous membranes are moist.      External Ears: Normal       Neck: No visualized mass      Pulmonary/Chest:  Respiratory effort normal.  No visualized signs of difficulty breathing or respiratory distress         Musculoskeletal:   Normal gait with no signs of ataxia. Normal range of motion of neck. Neurological:         No Facial Asymmetry (Cranial nerve 7 motor function) (limited exam to video visit) .   No gaze palsy              Skin:                     No significant exanthematous lesions or discoloration noted on facial skin                                        Psychiatric:          Normal Affect. No Hallucinations           Other pertinent observable physical exam findings:       ASSESSMENT/PLAN:  1. Nonintractable headache, unspecified chronicity pattern, unspecified headache type  - Push fluid - water. Tylenol or Aleve or Excedrin prn.   - COVID-19; Future    2. Fatigue, unspecified type  - Push fluids - water. Get plenty of rest and eat well balanced. If positive for COVID, you should self quarantine for 5-7 days from onset of symptoms and be fever free for > 24 hours, which ever is longer. If asymptomatic after 5- 7 days , additional 3-5 days can be out of quarantine with a mask per new guidelines. Total of 10 days. To ED if persistent shortness of breath or pulse ox < 90 % occurs. - COVID-19; Future    3. Cough  - Push fluids - water.    - COVID-19; Future  - promethazine-codeine (PHENERGAN WITH CODEINE) 6.25-10 MG/5ML syrup; Take 5 mLs by mouth 4 times daily as needed for Cough for up to 7 days. Dispense: 118 mL; Refill: 0  - beclomethasone (QVAR) 80 MCG/ACT inhaler; Inhale 2 puffs into the lungs 2 times daily  Dispense: 1 each; Refill: 1  - albuterol HFA prn.     4. Exercise-induced asthma  - Restart Beclomethasone (QVAR) 80 MCG/ACT inhaler; Inhale 2 puffs into the lungs 2 times daily  Dispense: 1 each; Refill: 1  - Albuterol HFA prn.     F/u if no improvement 3-4d/ prn increased symptoms. An  electronic signature was used to authenticate this note. --Darylene Inks, MD on 1/11/2022 at 11:42 AM    Pursuant to the emergency declaration under the [facility] waiver authority and the [employer] Act, this Virtual  Visit was conducted, with patient's consent, to reduce the patient's risk of exposure to COVID-19 and provide continuity of care for an established patient. Services were provided through a video synchronous discussion virtually to substitute for in-person clinic visit. [address], [address]  [phone]  Walk  ins welcome: ALICJA 7:30 am- 12:30 pm    Fax : [phone]

## 2022-02-22 ENCOUNTER — TELEPHONE (OUTPATIENT)
Dept: VASCULAR SURGERY | Age: 57
End: 2022-02-22

## 2022-02-22 NOTE — TELEPHONE ENCOUNTER
LM for PT to call and reschedule. Dr Luke Issa has emergency surgery and won't be there at that time. She can reschedule for FF next week or [place] in 2 weeks.

## 2022-03-30 ENCOUNTER — OFFICE VISIT (OUTPATIENT)
Dept: VASCULAR SURGERY | Age: 57
End: 2022-03-30
Payer: COMMERCIAL

## 2022-03-30 VITALS
SYSTOLIC BLOOD PRESSURE: 170 MMHG | HEIGHT: 66 IN | WEIGHT: 270.4 LBS | DIASTOLIC BLOOD PRESSURE: 100 MMHG | BODY MASS INDEX: 43.46 KG/M2

## 2022-03-30 DIAGNOSIS — M79.605 PAIN OF LEFT LOWER EXTREMITY: Primary | ICD-10-CM

## 2022-03-30 PROCEDURE — 99204 OFFICE O/P NEW MOD 45 MIN: CPT | Performed by: SURGERY

## 2022-03-30 RX ORDER — LANSOPRAZOLE 30 MG/1
CAPSULE, DELAYED RELEASE ORAL
COMMUNITY
Start: 2022-02-22

## 2022-03-30 ASSESSMENT — ENCOUNTER SYMPTOMS
EYES NEGATIVE: 1
GASTROINTESTINAL NEGATIVE: 1
RESPIRATORY NEGATIVE: 1

## 2022-03-30 NOTE — PROGRESS NOTES
Subjective:      Patient ID: Raya Enriquez is a 65 y.o. female. HPI Referral from Mayo Holstein MD for vascular evaluation at the patient's request because of concerns that she has significant \"vascular disease\" similar to what her father apparently has. Patient reports achy discomfort in her left calf and into her foot which is associated with significant tenderness when manipulated and massaged on a frequent basis. Denies any tissue breakdown with ulcerations on the ankles or foot wounds. Has not noted any significant varicosities and has had no previous venous intervention. No h/o SVT/DVT or wounds.     Past Medical History:   Diagnosis Date    Benign neoplasm of breast     Chondromalacia of patellofemoral joint 9/2/2015    Elevated liver enzymes 4/19/2013    Obstructive sleep apnea syndrome 1/18/2021    Patellofemoral dysfunction 8/14/2012    Personal history of cervical dysplasia     Pilonidal cyst without mention of abscess     Sciatica of right side 11/28/2011    TMJ arthropathy 3/14/2014    Trochanteric bursitis of right hip 9/2/2015    Urethral stricture unspecified      Past Surgical History:   Procedure Laterality Date    ARTHROPLASTY      temporomandibular joint    ENDOMETRIAL ABLATION  15/4652    Dr. Princess Herman LEAL      cervical conization    NASAL SEPTUM SURGERY      POLYPECTOMY      nasal endoscopy    SINUS SURGERY  03/2017    Endoscopic sinus surgery    ONELIA AND BSO Bilateral 06/22/2021    DUB    TONSILLECTOMY       Allergies   Allergen Reactions    Macrodantin [Nitrofurantoin]     Sulfa Antibiotics Hives     Current Outpatient Medications   Medication Sig Dispense Refill    lansoprazole (PREVACID) 30 MG delayed release capsule       beclomethasone (QVAR) 80 MCG/ACT inhaler Inhale 2 puffs into the lungs 2 times daily 1 each 1    vilazodone HCl (VILAZODONE HCL) 10 MG TABS Take 10 mg by mouth daily      spironolactone (ALDACTONE) 25 MG tablet TAKE 1 TABLET BY MOUTH TWO TIMES DAILY 180 tablet 1    valACYclovir (VALTREX) 500 MG tablet Take 1 tablet by mouth 2 times daily 10 tablet 12    acyclovir (ZOVIRAX) 5 % ointment Apply topically every 3 hours. 90 g 0    fluticasone (FLONASE) 50 MCG/ACT nasal spray 1 spray by Nasal route daily 3 Bottle 3    LORATADINE PO Take 1 tablet by mouth as needed      therapeutic multivitamin-minerals (THERAGRAN-M) tablet Take 1 tablet by mouth daily.  pantoprazole (PROTONIX) 40 MG tablet Take 1 tablet by mouth 2 times daily (before meals) (Patient not taking: Reported on 3/30/2022) 180 tablet 1    busPIRone (BUSPAR) 5 MG tablet Take 15 mg by mouth nightly  (Patient not taking: Reported on 3/30/2022)       No current facility-administered medications for this visit. Social History     Socioeconomic History    Marital status:      Spouse name: Not on file    Number of children: Not on file    Years of education: Not on file    Highest education level: Not on file   Occupational History    Not on file   Tobacco Use    Smoking status: Never Smoker    Smokeless tobacco: Never Used   Vaping Use    Vaping Use: Never used   Substance and Sexual Activity    Alcohol use: No    Drug use: No    Sexual activity: Yes     Partners: Male   Other Topics Concern    Not on file   Social History Narrative    Not on file     Social Determinants of Health     Financial Resource Strain:     Difficulty of Paying Living Expenses: Not on file   Food Insecurity:     Worried About Running Out of Food in the Last Year: Not on file    Samuel of Food in the Last Year: Not on file   Transportation Needs:     Lack of Transportation (Medical): Not on file    Lack of Transportation (Non-Medical):  Not on file   Physical Activity:     Days of Exercise per Week: Not on file    Minutes of Exercise per Session: Not on file   Stress:     Feeling of Stress : Not on file   Social Connections:     Frequency of Communication with Friends and Family: Not on file    Frequency of Social Gatherings with Friends and Family: Not on file    Attends Evangelical Services: Not on file    Active Member of Clubs or Organizations: Not on file    Attends Club or Organization Meetings: Not on file    Marital Status: Not on file   Intimate Partner Violence:     Fear of Current or Ex-Partner: Not on file    Emotionally Abused: Not on file    Physically Abused: Not on file    Sexually Abused: Not on file   Housing Stability:     Unable to Pay for Housing in the Last Year: Not on file    Number of [place] in the Last Year: Not on file    Unstable Housing in the Last Year: Not on file     Family History   Problem Relation Age of Onset    Cancer Mother         skin cancer    Other Father         PVD    Other Brother         a&w    Hypertension Paternal Grandmother     Other Paternal Grandfather         myeloma         Review of Systems   Constitutional: Positive for unexpected weight change. HENT: Negative. Eyes: Negative. Respiratory: Negative. Cardiovascular: Positive for leg swelling. Gastrointestinal: Negative. Endocrine: Negative. Genitourinary: Negative. Musculoskeletal: Negative. Neurological: Negative. Hematological: Negative. Psychiatric/Behavioral: Negative. 15 pt ROS confirmed personally by this MD.    Objective:   Physical Exam  Vitals and nursing note reviewed. Constitutional:       Appearance: Normal appearance. She is obese. HENT:      Head: Normocephalic and atraumatic. Right Ear: External ear normal.      Left Ear: External ear normal.      Nose: Nose normal.      Mouth/Throat:      Mouth: Mucous membranes are moist.      Pharynx: Oropharynx is clear. Eyes:      Extraocular Movements: Extraocular movements intact. Conjunctiva/sclera: Conjunctivae normal.   Cardiovascular:      Heart sounds: Normal heart sounds.    Pulmonary:      Effort: Pulmonary effort is normal.      Breath sounds: Normal breath sounds. Abdominal:      General: Bowel sounds are normal.      Palpations: Abdomen is soft. There is no mass. Hernia: No hernia is present. Musculoskeletal:      Right lower leg: No edema. Left lower leg: No edema. Skin:     General: Skin is warm and dry. Capillary Refill: Capillary refill takes less than 2 seconds. Findings: No erythema, lesion or rash. Neurological:      General: No focal deficit present. Mental Status: She is alert and oriented to person, place, and time. Cranial Nerves: No cranial nerve deficit. Sensory: No sensory deficit. Motor: No weakness. Coordination: Coordination normal.      Gait: Gait normal.   Psychiatric:         Mood and Affect: Mood normal.         Behavior: Behavior normal.         Thought Content: Thought content normal.         Judgment: Judgment normal.       Pulses:   R bruit  L bruit   2   carotid 2    2   brachial 2    2   radial 2    2   femoral 2    2   popliteal 2    2   posterior tibial 2    2   dorsalis pedis 2    na   bypass graft na          Assessment:      Nonspecific left leg pain with intermittent edema. No evidence of arterial or significant venous disease. Small claf/ankle varicose veins. Morbid obesity      Plan:      Observation. Weight loss as possible. Begin therapeutic 20/30 mmHg knee high compression stockings on a daily basis - prescription provided. Continue evaluation and treatment of all other issues that might contribute to leg and foot pain. No plans for further vascular imaging or intervention. All questions answered. Pt understands. F/U prn.

## 2022-03-30 NOTE — Clinical Note
Francisco Javier Cage,    I saw Tati Fernandez in the office today for evaluation of her leg complaints and for general vascular evaluation. She has no evidence of arterial disease and has small varicosities of her left ankle. I do not believe her complaints and symptoms are related to significant venous disease and at this point I have recommended she continue wearing compression stockings and follow-up in the future should she develop any worsening symptoms or findings. I would not plan any further work-up or intervention at this time. If you have any questions please feel free to contact me. Thanks for asked me to see her and please let me know if I can help you with any other patients in the future.     Stella Cooper

## 2022-03-31 ENCOUNTER — OFFICE VISIT (OUTPATIENT)
Dept: FAMILY MEDICINE CLINIC | Age: 57
End: 2022-03-31
Payer: COMMERCIAL

## 2022-03-31 VITALS
RESPIRATION RATE: 24 BRPM | BODY MASS INDEX: 43.13 KG/M2 | OXYGEN SATURATION: 100 % | TEMPERATURE: 96.8 F | HEART RATE: 87 BPM | SYSTOLIC BLOOD PRESSURE: 148 MMHG | DIASTOLIC BLOOD PRESSURE: 85 MMHG | WEIGHT: 267.2 LBS

## 2022-03-31 DIAGNOSIS — R03.0 ELEVATED BLOOD PRESSURE READING: Primary | ICD-10-CM

## 2022-03-31 DIAGNOSIS — J30.1 ALLERGIC RHINITIS DUE TO POLLEN, UNSPECIFIED SEASONALITY: Chronic | ICD-10-CM

## 2022-03-31 DIAGNOSIS — M79.7 FIBROMYALGIA: Chronic | ICD-10-CM

## 2022-03-31 DIAGNOSIS — R19.7 DIARRHEA, UNSPECIFIED TYPE: ICD-10-CM

## 2022-03-31 DIAGNOSIS — Z11.59 NEED FOR HEPATITIS C SCREENING TEST: ICD-10-CM

## 2022-03-31 PROCEDURE — 99214 OFFICE O/P EST MOD 30 MIN: CPT | Performed by: FAMILY MEDICINE

## 2022-03-31 RX ORDER — AZELASTINE HYDROCHLORIDE 137 UG/1
SPRAY, METERED NASAL
COMMUNITY
Start: 2022-03-19

## 2022-03-31 RX ORDER — DESVENLAFAXINE 25 MG/1
25 TABLET, EXTENDED RELEASE ORAL DAILY
COMMUNITY
Start: 2022-03-14

## 2022-03-31 NOTE — PROGRESS NOTES
Patient is here for elevated blood pressure and diarrhea. Diarrhea started this am .  Her headache started a bit last night and worse this am .  Took Tylenol and helped some. She was started on Azelastine with Fluticasone NS per allergist.  She was found to have allergy to cats and trees. Her mother has cats , but kept them in basement. Some hoarseness - chronic. No allergy shots currently , but had previously - dust and mold. No watery , scratchy eyes. Her son moved in with 1 daughter in law and 4 children , 2 dogs and 1 cat. Visits her mom, 77 yo,  in [place] and has 1 cat- 19 lbs and mom moved to a [place]. Nasal congestion and post nasal drip . Was coughing, but not currently. ENT placed her on steroids and antibiotics and helped. Taking Claritin qd. No fever . No chest pain or shortness of breath . She is to see Dr. Adriano Medeiros 's NP , pain management clinic , tomorrow. Caffeine = large pop 32 oz qod. Tea - 1 cup in am.  1 Pure K qod or so. ( 2- 3 caffeine/ day ). Alcohol :  Rarely , but did have 1 glass on Tuesday . Her blood pressure was high last night. Takes Ibuprofen 800 mg 3 times per week. Review of Systems    ROS: All other systems were reviewed and are negative . Patient's allergies and medications were reviewed. Patient's past medical, surgical, social , and family history were reviewed. BP Readings from Last 3 Encounters:   03/31/22 (!) 150/80   03/30/22 (!) 170/100   11/23/21 134/76     Wt Readings from Last 3 Encounters:   03/31/22 267 lb 3.2 oz (121.2 kg)   03/30/22 270 lb 6.4 oz (122.7 kg)   11/23/21 271 lb 12.8 oz (123.3 kg)     OBJECTIVE:  BP (!) 148/85   Pulse 87   Temp 96.8 °F (36 °C)   Resp 24   Wt 267 lb 3.2 oz (121.2 kg)   SpO2 100%   BMI 43.13 kg/m²     Physical Exam    General: NAD, cooperative, alert and oriented X 3. Mood / affect is good. good insight. well hydrated. HEENT: PERRLA, EOMI, TMs - clear. Nasopharynx clear.     Neck : no lymphadenopathy, supple, FROM  CV: Regular rate and rhythm , no murmurs/ rub/ gallop. No edema. Lungs : CTA bilaterally, breathing comfortably  Abdomen: positive bowel sounds, soft , non tender, non distended. No hepatosplenomegaly. No CVA tenderness. Skin: no rashes. Non tender. ASSESSMENT/  PLAN:  1. Elevated blood pressure reading  - Avoid caffeine and alcohol and decongestants. Stop Excedrin or Ibuprofen. Tylenol prn.    - Follow low sodium diet. Weight loss recommended, as well as CV exercise > 150 minutes/ week. - Hold on starting blood pressure medication, but if not improved on follow up in 4-6 weeks , need to strongly consider.   - CBC; Future  - Comprehensive Metabolic Panel; Future  - TSH; Future  - Monitor blood pressure and follow up if persistent elevation > 139/89. 2. Allergic rhinitis due to pollen, unspecified seasonality  - Followed by Allergist and may benefit from allergy shots, particularly if not responding to addition of Azelastine with Fluticasone NS qhs per allergist.  Continue Zyrtec qd or try Xyzal qd. - Minimize cat exposure as much as possible. 3. Fibromyalgia  - Recommended daily , low impact exercise. 4. Diarrhea, unspecified type  - Push fluids - water. [place] diet recommended. Avoid caffeine.  - Hold on stool studies. 5. Need for hepatitis C screening test  - Hepatitis C Antibody; Future     F/u if no improvement 5d/ prn increased symptoms/ otherwise follow up in 4-6 weeks and bring blood pressure log.

## 2022-04-04 DIAGNOSIS — R03.0 ELEVATED BLOOD PRESSURE READING: ICD-10-CM

## 2022-04-04 DIAGNOSIS — Z11.59 NEED FOR HEPATITIS C SCREENING TEST: ICD-10-CM

## 2022-04-04 LAB
HCT VFR BLD CALC: 39.2 % (ref 36–48)
HEMOGLOBIN: 12.8 G/DL (ref 12–16)
HEPATITIS C ANTIBODY INTERPRETATION: NORMAL
MCH RBC QN AUTO: 25.6 PG (ref 26–34)
MCHC RBC AUTO-ENTMCNC: 32.7 G/DL (ref 31–36)
MCV RBC AUTO: 78.3 FL (ref 80–100)
PDW BLD-RTO: 14.2 % (ref 12.4–15.4)
PLATELET # BLD: 271 K/UL (ref 135–450)
PMV BLD AUTO: 8.6 FL (ref 5–10.5)
RBC # BLD: 5.01 M/UL (ref 4–5.2)
WBC # BLD: 6.4 K/UL (ref 4–11)

## 2022-04-05 LAB
A/G RATIO: 1.7 (ref 1.1–2.2)
ALBUMIN SERPL-MCNC: 4.5 G/DL (ref 3.4–5)
ALP BLD-CCNC: 113 U/L (ref 40–129)
ALT SERPL-CCNC: 40 U/L (ref 10–40)
ANION GAP SERPL CALCULATED.3IONS-SCNC: 15 MMOL/L (ref 3–16)
AST SERPL-CCNC: 26 U/L (ref 15–37)
BILIRUB SERPL-MCNC: 0.6 MG/DL (ref 0–1)
BUN BLDV-MCNC: 16 MG/DL (ref 7–20)
CALCIUM SERPL-MCNC: 9.8 MG/DL (ref 8.3–10.6)
CHLORIDE BLD-SCNC: 101 MMOL/L (ref 99–110)
CO2: 23 MMOL/L (ref 21–32)
CREAT SERPL-MCNC: 0.6 MG/DL (ref 0.6–1.1)
GFR AFRICAN AMERICAN: >60
GFR NON-AFRICAN AMERICAN: >60
GLUCOSE BLD-MCNC: 97 MG/DL (ref 70–99)
POTASSIUM SERPL-SCNC: 4.5 MMOL/L (ref 3.5–5.1)
SODIUM BLD-SCNC: 139 MMOL/L (ref 136–145)
TOTAL PROTEIN: 7.2 G/DL (ref 6.4–8.2)
TSH SERPL DL<=0.05 MIU/L-ACNC: 0.88 UIU/ML (ref 0.27–4.2)

## 2022-06-27 ENCOUNTER — TELEPHONE (OUTPATIENT)
Dept: FAMILY MEDICINE CLINIC | Age: 57
End: 2022-06-27

## 2022-06-27 NOTE — TELEPHONE ENCOUNTER
----- Message from Marisol Barbour sent at 6/27/2022 12:47 PM EDT -----  Subject: Message to Provider    QUESTIONS  Information for Provider? Patient needs genetic testing info faxed to Dr. Annamary Dance. [facility], [phone]. Please call patient to discuss or   confirm when done.   ---------------------------------------------------------------------------  --------------  CALL BACK INFO  What is the best way for the office to contact you? OK to leave message on   voicemail  Preferred Call Back Phone Number? [phone]  ---------------------------------------------------------------------------  --------------  SCRIPT ANSWERS  Relationship to Patient?  Self

## 2022-06-28 NOTE — TELEPHONE ENCOUNTER
Called patient and gave her instructions and patient said she would continue to use machine.
Patient called regarding the recall and has stopped using her machine. Please advise.  [phone]
We fully understand although this new notification can be alarming. For the direct recall protocols and procedures you will need to contact your DME and/Or [employer] directly. [email or web address] or [phone]    At this time if you are comfortable and not experiencing symptoms (cough/unusual odors) we advise you continue to use the machine. As the recall was voluntary, the actual risk is very low. We do advise that the rooms where the machine is utilized be kept warm or cool. I am sorry for this changing times and we too are waiting for further directions from the company.
no

## 2022-12-13 ENCOUNTER — TELEMEDICINE (OUTPATIENT)
Dept: PULMONOLOGY | Age: 57
End: 2022-12-13
Payer: COMMERCIAL

## 2022-12-13 DIAGNOSIS — E66.9 NON MORBID OBESITY, UNSPECIFIED OBESITY TYPE: Chronic | ICD-10-CM

## 2022-12-13 DIAGNOSIS — G47.33 OBSTRUCTIVE SLEEP APNEA SYNDROME: Primary | ICD-10-CM

## 2022-12-13 DIAGNOSIS — I10 HYPERTENSION, UNSPECIFIED TYPE: ICD-10-CM

## 2022-12-13 PROCEDURE — 99214 OFFICE O/P EST MOD 30 MIN: CPT | Performed by: NURSE PRACTITIONER

## 2022-12-13 ASSESSMENT — SLEEP AND FATIGUE QUESTIONNAIRES
HOW LIKELY ARE YOU TO NOD OFF OR FALL ASLEEP WHILE SITTING INACTIVE IN A PUBLIC PLACE: 0
HOW LIKELY ARE YOU TO NOD OFF OR FALL ASLEEP WHEN YOU ARE A PASSENGER IN A CAR FOR AN HOUR WITHOUT A BREAK: 0
ESS TOTAL SCORE: 4
HOW LIKELY ARE YOU TO NOD OFF OR FALL ASLEEP WHILE SITTING AND TALKING TO SOMEONE: 0
HOW LIKELY ARE YOU TO NOD OFF OR FALL ASLEEP WHILE WATCHING TV: 0
HOW LIKELY ARE YOU TO NOD OFF OR FALL ASLEEP WHILE SITTING QUIETLY AFTER LUNCH WITHOUT ALCOHOL: 1
HOW LIKELY ARE YOU TO NOD OFF OR FALL ASLEEP WHILE SITTING AND READING: 1
HOW LIKELY ARE YOU TO NOD OFF OR FALL ASLEEP IN A CAR, WHILE STOPPED FOR A FEW MINUTES IN TRAFFIC: 0
HOW LIKELY ARE YOU TO NOD OFF OR FALL ASLEEP WHILE LYING DOWN TO REST IN THE AFTERNOON WHEN CIRCUMSTANCES PERMIT: 2

## 2022-12-13 NOTE — ASSESSMENT & PLAN NOTE
Chronic-with progression/exacerbation: Reviewed and analyzed results of physiologic download from patient's machine and reviewed with patient. Supplies and parts as needed for her machine. These are medically necessary. Limit caffeine use after 3pm. Based on the analyzed data will change following settings: P min increased to 10, P max increased to 20. Changes sent via machine modem. Will trial pressure increase due to increased symptoms, uncontrolled Hypertension, increased headaches, and weight gain. Discussed if no improvement, may need to consider and in lab titration. Encouraged her to work with her DME on mask fit and comfort. Provided resources for her to view different styles of masks. Discussed how to adjust the moisture settings on her machine. Will see her back in 3 months. Encouraged her to contact the office with any questions or concerns. Encouraged consistent use of her machine each night, all night. Discussed the importance of treating Obstructive sleep apnea from a physiological standpoint. Instructed not to drive unless had 4 hrs of effective therapy for her ALEXIA the night before. Did review the risks of under or untreated ALEXIA including, but not limited to, higher risks of motor vehicle accidents, stroke, heart attacks, and death. She understands and accepts all these risks.

## 2022-12-13 NOTE — PROGRESS NOTES
Deidre Primrose MD  Sanborn Bias CNP [address]  [address] [facility]  [address], [address] [facility] [phone]   [facility] Babak Mckeon. [address] [place] [address]. Lucy Turcios [phone]     Video Visit- Follow up    Ryan Ramirez, was evaluated through a synchronous (real-time) audio-video  encounter. The patient (or guardian if applicable) is aware that this is a billable  service, which includes applicable co-pays. This Virtual Visit was conducted with  patient's (and/or legal guardian's) consent. The visit was conducted pursuant to  the emergency declaration under the [address] [place] [address], [address] [place] [address] [facility] authority and the [employer] and  [employer] General Act. Patient identification was verified,  and a caregiver was present when appropriate. The patient was located in a  state where the provider was licensed to provide care. Assessment/Plan:      1. Obstructive sleep apnea syndrome  Assessment & Plan:  Chronic-with progression/exacerbation: Reviewed and analyzed results of physiologic download from patient's machine and reviewed with patient. Supplies and parts as needed for her machine. These are medically necessary. Limit caffeine use after 3pm. Based on the analyzed data will change following settings: P min increased to 10, P max increased to 20. Changes sent via machine modem. Will trial pressure increase due to increased symptoms, uncontrolled Hypertension, increased headaches, and weight gain. Discussed if no improvement, may need to consider and in lab titration. Encouraged her to work with her DME on mask fit and comfort. Provided resources for her to view different styles of masks. Discussed how to adjust the moisture settings on her machine. Will see her back in 3 months. Encouraged her to contact the office with any questions or concerns. Encouraged consistent use of her machine each night, all night.  Discussed the importance of treating Obstructive sleep apnea from a physiological standpoint. Instructed not to drive unless had 4 hrs of effective therapy for her ALEXIA the night before. Did review the risks of under or untreated ALEXIA including, but not limited to, higher risks of motor vehicle accidents, stroke, heart attacks, and death. She understands and accepts all these risks. 2. Hypertension, unspecified type  Assessment & Plan:   Chronic- Stable. Discussed the importance of treating obstructive sleep apnea as part of the management of this disorder. Cont any meds per PCP and other physicians. 3. Non morbid obesity, unspecified obesity type  Assessment & Plan:   Chronic-not stable:  Discussed importance of treating obstructive sleep apnea and getting sufficient sleep to assist with weight control. Encouraged her to work on weight loss through diet and exercise. Recommended DASH or Mediterranean diets. Reviewed, analyzed, and documented physiologic data from patient's PAP machine. This information was analyzed to assess complexity and medical decision making in regards to further testing and management. The primary encounter diagnosis was Obstructive sleep apnea syndrome. Diagnoses of Hypertension, unspecified type and Non morbid obesity, unspecified obesity type were also pertinent to this visit. The chronic medical conditions listed are directly related to the primary diagnosis listed above. The management of the primary diagnosis affects the secondary diagnosis and vice versa. Subjective:     Patient ID: Mireya Hutson is a 65 y.o. female.     Chief Complaint   Patient presents with    Sleep Apnea     Subjective   HPI:    Machine Modem/Download Info:  Compliance (hours/night): 5.3 hrs/night  % of nights >= 4 hrs: 63.3 %  Download AHI (/hour): 3 /HR  Average CPAP Pressure : 12.9 cmH2O      APAP - Settings  Pressure Min: 6 cmH2O  Pressure Max: 16 cmH2O                 Comfort Settings  Humidity Level (0-8): 3  Flex/EPR (0-3): 3 PAP Mask  Mask Type: Full Face mask     Araseli Samuels presents today for follow up for sleep apnea. She has received her replacement machine for the  recall. She reports she is doing well with her machine. She has gained 30 pounds over the last few years, blood pressure has been harder to control, new diagnosis of prediabetes, and does not feel she is sleeping well. She is seeing weight management. She has some trouble with mask comfort and will usually take her mask off between 430-630 am and sleep a little longer without it in her chair. She was unable to use her machine some nights over the last 90 days because she had cysts removed from her scalp and the headgear irritated the incisions. The pressure on her machine is comfortable. She has chronic allergies and takes Mucinex and Allegra daily and also has allergy shots which seem to help. She has also noticed increased headaches. she denies nosebleeds, dryness, aerophagia, or drowsiness while driving. [address]    Alma - Alma Sleepiness Score: 4    Current Outpatient Medications   Medication Instructions    Azelastine HCl 137 MCG/SPRAY SOLN No dose, route, or frequency recorded. beclomethasone (QVAR) 80 MCG/ACT inhaler 2 puffs, Inhalation, 2 TIMES DAILY    desvenlafaxine succinate (PRISTIQ) 25 mg, Oral, DAILY    fluticasone (FLONASE) 50 MCG/ACT nasal spray 1 spray, Nasal, DAILY    lansoprazole (PREVACID) 30 MG delayed release capsule No dose, route, or frequency recorded. LORATADINE PO 1 tablet, Oral, PRN    PROAIR  (90 Base) MCG/ACT inhaler No dose, route, or frequency recorded.     spironolactone (ALDACTONE) 25 MG tablet TAKE 1 TABLET BY MOUTH TWO  TIMES DAILY    therapeutic multivitamin-minerals (THERAGRAN-M) tablet 1 tablet, DAILY    valACYclovir (VALTREX) 500 mg, Oral, 2 TIMES DAILY    vilazodone HCl (VIIBRYD) 10 mg, DAILY        Electronically signed by KATRIN Sidhu on 12/13/2022 at 1:20 PM

## 2022-12-13 NOTE — LETTER
[facility]  [address] [facility]  [address] [address]  Phone: [phone]  Fax: [phone]    December 13, 2022       Patient: Ghada Cruz   MR Number: [id number]   YOB: 1965   Date of Visit: 12/13/2022       Teresa Hernandez was seen for a follow up visit today. Here is my assessment and plan as well as an attached copy of her visit today:    Non morbid obesity, unspecified obesity type   Chronic-not stable:  Discussed importance of treating obstructive sleep apnea and getting sufficient sleep to assist with weight control. Encouraged her to work on weight loss through diet and exercise. Recommended DASH or Mediterranean diets. Obstructive sleep apnea syndrome  Chronic-with progression/exacerbation: Reviewed and analyzed results of physiologic download from patient's machine and reviewed with patient. Supplies and parts as needed for her machine. These are medically necessary. Limit caffeine use after 3pm. Based on the analyzed data will change following settings: P min increased to 10, P max increased to 20. Changes sent via machine modem. Will trial pressure increase due to increased symptoms, uncontrolled Hypertension, increased headaches, and weight gain. Discussed if no improvement, may need to consider and in lab titration. Encouraged her to work with her DME on mask fit and comfort. Provided resources for her to view different styles of masks. Discussed how to adjust the moisture settings on her machine. Will see her back in 3 months. Encouraged her to contact the office with any questions or concerns. Encouraged consistent use of her machine each night, all night. Discussed the importance of treating Obstructive sleep apnea from a physiological standpoint. Instructed not to drive unless had 4 hrs of effective therapy for her ALEXIA the night before.   Did review the risks of under or untreated ALEXIA including, but not limited to, higher risks of motor vehicle accidents, stroke, heart attacks, and death. She understands and accepts all these risks. HTN (hypertension)   Chronic- Stable. Discussed the importance of treating obstructive sleep apnea as part of the management of this disorder. Cont any meds per PCP and other physicians. If you have questions or concerns, please do not hesitate to call me. I look forward to following Toney Clancy along with you.     Sincerely,    KATRIN Johnson    CC providers:  Darshan Azevedo MD  [place] [phone]  Via In [facility]

## 2022-12-13 NOTE — PROGRESS NOTES
Diagnosis: [x] ALEXIA (G47.33) [] CSA (G47.31) [] Apnea (G47.30)   Length of Need: [x] 15 Months [] 99 Months [] Other:   Machine (JESSY!): [] Respironics Dream Station      Auto [] ResMed AirSense     Auto [] Other:     []  CPAP () [] Bilevel ()   Mode: [] Auto [] Spontaneous    Mode: [] Auto [] Spontaneous             Comfort Settings:      Humidifier: [] Heated ()        [x] Water chamber replacement ()/ 1 per 6 months        Mask:   [] Nasal () /1 per 3 months [x] Full Face () /1 per 3 months   [] Patient choice -Size and fit mask [x] Patient Choice - Size and fit mask   [] Dispense: [] Dispense:   [] Headgear () / 1 per 3 months [x] Headgear () / 1 per 3 months   [] Replacement Nasal Cushion ()/2 per month [x] Interface Replacement ()/1 per month   [] Replacement Nasal Pillows ()/2 per month         Tubing: [x] Heated ()/1 per 3 months    [] Standard ()/1 per 3 months [] Other:           Filters: [x] Non-disposable ()/1 per 6 months     [x] Ultra-Fine, Disposable ()/2 per month        Miscellaneous: [] Chin Strap ()/ 1 per 6 months [] O2 bleed-in:        LPM   [] Oxymetry on CPAP/Bilevel []  Other:         Start Order Date: 12/13/22    MEDICAL JUSTIFICATION:  I, the undersigned, certify that the above prescribed supplies are medically necessary for this patients wellbeing. In my opinion, the supplies are both reasonable and necessary in reference to accepted standards of medicalpractice in treatment of this patients condition. Saqib Estrada NP    NPI: [id number]       Order Signed Date: 12/13/22  [facility]  Pulmonary, Sleep, and Critical Care    Pulmonary, Sleep, and Critical Care  [address] [place] [address].  [facility], [address] [facility] , [address]                                    Myke Last  Phone: [phone]    Fax: Lisa Sinclair Bora Candy  1965  [address] [facility] [address]  [phone] (home)   [phone] (mobile)      Insurance Info (confirm with patient if correct):  Payer/Plan Subscr  Sex Relation Sub.  Ins. ID Effective Group Num

## 2023-01-01 NOTE — TELEPHONE ENCOUNTER
From: Evelyn Womack  To: Keegan Agudelo MD  Sent: 6/22/2020 12:43 PM EDT  Subject: Non-Urgent Medical Question    Dr. Jalyn Mehta,. Just checking when my last blood work was completed. I'm feeling rather sluggish. I completely went off the viybrid a few months ago. I added some vitamin d to my multivitamin. I'm just tired a lot which affects my motivation. Just seeing if I was due for blood work to see what type of vitamin I might be missing.  Thank you Mikel Gleason
Please advise
none

## 2023-03-16 ENCOUNTER — ANESTHESIA EVENT (OUTPATIENT)
Dept: ENDOSCOPY | Age: 58
End: 2023-03-16
Payer: COMMERCIAL

## 2023-03-16 ENCOUNTER — HOSPITAL ENCOUNTER (OUTPATIENT)
Age: 58
Setting detail: OUTPATIENT SURGERY
Discharge: HOME | End: 2023-03-16
Attending: INTERNAL MEDICINE | Admitting: INTERNAL MEDICINE
Payer: COMMERCIAL

## 2023-03-16 ENCOUNTER — ANESTHESIA (OUTPATIENT)
Dept: ENDOSCOPY | Age: 58
End: 2023-03-16
Payer: COMMERCIAL

## 2023-03-16 VITALS
WEIGHT: 275 LBS | BODY MASS INDEX: 43.16 KG/M2 | HEART RATE: 85 BPM | TEMPERATURE: 96.8 F | HEIGHT: 67 IN | OXYGEN SATURATION: 99 % | DIASTOLIC BLOOD PRESSURE: 80 MMHG | SYSTOLIC BLOOD PRESSURE: 145 MMHG | RESPIRATION RATE: 16 BRPM

## 2023-03-16 DIAGNOSIS — K58.0 IRRITABLE BOWEL SYNDROME WITH DIARRHEA: ICD-10-CM

## 2023-03-16 DIAGNOSIS — K21.9 GERD WITHOUT ESOPHAGITIS: ICD-10-CM

## 2023-03-16 LAB
GLUCOSE BLD-MCNC: 102 MG/DL (ref 70–99)
PERFORMED ON: ABNORMAL

## 2023-03-16 PROCEDURE — 3609012400 HC EGD TRANSORAL BIOPSY SINGLE/MULTIPLE: Performed by: INTERNAL MEDICINE

## 2023-03-16 PROCEDURE — 88342 IMHCHEM/IMCYTCHM 1ST ANTB: CPT

## 2023-03-16 PROCEDURE — 3700000001 HC ADD 15 MINUTES (ANESTHESIA): Performed by: INTERNAL MEDICINE

## 2023-03-16 PROCEDURE — 3700000000 HC ANESTHESIA ATTENDED CARE: Performed by: INTERNAL MEDICINE

## 2023-03-16 PROCEDURE — 3609010600 HC COLONOSCOPY POLYPECTOMY SNARE/COLD BIOPSY: Performed by: INTERNAL MEDICINE

## 2023-03-16 PROCEDURE — 7100000010 HC PHASE II RECOVERY - FIRST 15 MIN: Performed by: INTERNAL MEDICINE

## 2023-03-16 PROCEDURE — 6360000002 HC RX W HCPCS: Performed by: NURSE ANESTHETIST, CERTIFIED REGISTERED

## 2023-03-16 PROCEDURE — 7100000011 HC PHASE II RECOVERY - ADDTL 15 MIN: Performed by: INTERNAL MEDICINE

## 2023-03-16 PROCEDURE — 2580000003 HC RX 258: Performed by: ANESTHESIOLOGY

## 2023-03-16 PROCEDURE — 2709999900 HC NON-CHARGEABLE SUPPLY: Performed by: INTERNAL MEDICINE

## 2023-03-16 PROCEDURE — 2500000003 HC RX 250 WO HCPCS: Performed by: NURSE ANESTHETIST, CERTIFIED REGISTERED

## 2023-03-16 PROCEDURE — 88305 TISSUE EXAM BY PATHOLOGIST: CPT

## 2023-03-16 RX ORDER — PROPOFOL 10 MG/ML
INJECTION, EMULSION INTRAVENOUS PRN
Status: DISCONTINUED | OUTPATIENT
Start: 2023-03-16 | End: 2023-03-16 | Stop reason: SDUPTHER

## 2023-03-16 RX ORDER — FAMOTIDINE 40 MG/1
40 TABLET, FILM COATED ORAL 2 TIMES DAILY
COMMUNITY

## 2023-03-16 RX ORDER — LIDOCAINE HYDROCHLORIDE 10 MG/ML
1 INJECTION, SOLUTION EPIDURAL; INFILTRATION; INTRACAUDAL; PERINEURAL
Status: DISCONTINUED | OUTPATIENT
Start: 2023-03-16 | End: 2023-03-16 | Stop reason: HOSPADM

## 2023-03-16 RX ORDER — LIDOCAINE HYDROCHLORIDE 20 MG/ML
INJECTION, SOLUTION EPIDURAL; INFILTRATION; INTRACAUDAL; PERINEURAL PRN
Status: DISCONTINUED | OUTPATIENT
Start: 2023-03-16 | End: 2023-03-16 | Stop reason: SDUPTHER

## 2023-03-16 RX ORDER — SODIUM CHLORIDE, SODIUM LACTATE, POTASSIUM CHLORIDE, CALCIUM CHLORIDE 600; 310; 30; 20 MG/100ML; MG/100ML; MG/100ML; MG/100ML
INJECTION, SOLUTION INTRAVENOUS CONTINUOUS
Status: DISCONTINUED | OUTPATIENT
Start: 2023-03-16 | End: 2023-03-16 | Stop reason: HOSPADM

## 2023-03-16 RX ORDER — GLYCOPYRROLATE 0.2 MG/ML
INJECTION INTRAMUSCULAR; INTRAVENOUS PRN
Status: DISCONTINUED | OUTPATIENT
Start: 2023-03-16 | End: 2023-03-16 | Stop reason: SDUPTHER

## 2023-03-16 RX ORDER — MIDAZOLAM HYDROCHLORIDE 1 MG/ML
INJECTION INTRAMUSCULAR; INTRAVENOUS PRN
Status: DISCONTINUED | OUTPATIENT
Start: 2023-03-16 | End: 2023-03-16 | Stop reason: SDUPTHER

## 2023-03-16 RX ORDER — SODIUM CHLORIDE 0.9 % (FLUSH) 0.9 %
5-40 SYRINGE (ML) INJECTION PRN
Status: DISCONTINUED | OUTPATIENT
Start: 2023-03-16 | End: 2023-03-16 | Stop reason: HOSPADM

## 2023-03-16 RX ORDER — FEXOFENADINE HCL 180 MG/1
180 TABLET ORAL DAILY
COMMUNITY

## 2023-03-16 RX ORDER — SODIUM CHLORIDE 0.9 % (FLUSH) 0.9 %
5-40 SYRINGE (ML) INJECTION EVERY 12 HOURS SCHEDULED
Status: DISCONTINUED | OUTPATIENT
Start: 2023-03-16 | End: 2023-03-16 | Stop reason: HOSPADM

## 2023-03-16 RX ORDER — PROPOFOL 10 MG/ML
INJECTION, EMULSION INTRAVENOUS CONTINUOUS PRN
Status: DISCONTINUED | OUTPATIENT
Start: 2023-03-16 | End: 2023-03-16 | Stop reason: SDUPTHER

## 2023-03-16 RX ADMIN — SODIUM CHLORIDE, POTASSIUM CHLORIDE, SODIUM LACTATE AND CALCIUM CHLORIDE: 600; 310; 30; 20 INJECTION, SOLUTION INTRAVENOUS at 12:18

## 2023-03-16 RX ADMIN — PROPOFOL 50 MG: 10 INJECTION, EMULSION INTRAVENOUS at 13:21

## 2023-03-16 RX ADMIN — LIDOCAINE HYDROCHLORIDE 60 MG: 20 INJECTION, SOLUTION EPIDURAL; INFILTRATION; INTRACAUDAL; PERINEURAL at 13:16

## 2023-03-16 RX ADMIN — PROPOFOL 100 MG: 10 INJECTION, EMULSION INTRAVENOUS at 13:16

## 2023-03-16 RX ADMIN — MIDAZOLAM HYDROCHLORIDE 2 MG: 2 INJECTION, SOLUTION INTRAMUSCULAR; INTRAVENOUS at 13:16

## 2023-03-16 RX ADMIN — GLYCOPYRROLATE 0.2 MG: 0.2 INJECTION INTRAMUSCULAR; INTRAVENOUS at 13:39

## 2023-03-16 RX ADMIN — PROPOFOL 140 MCG/KG/MIN: 10 INJECTION, EMULSION INTRAVENOUS at 13:16

## 2023-03-16 ASSESSMENT — PAIN - FUNCTIONAL ASSESSMENT: PAIN_FUNCTIONAL_ASSESSMENT: 0-10

## 2023-03-16 NOTE — PROGRESS NOTES
Ambulatory Surgery/Procedure Discharge Note    Vitals:    03/16/23 1430   BP: (!) 145/80   Pulse: 85   Resp: 16   Temp:    SpO2: 99%   Patient meets criteria for discharge per Geeta score. No intake/output data recorded. Restroom use offered before discharge. Yes    Pain assessment:  none  Pain Level: 0    Pt and S.O./family states \"ready to go home\". Pt alert and oriented x4. IV removed. Denies N/V or pain. Discharge instructions given to pt and daughter in law with pt permission. Pt and daughter in law  verbalized understanding of all instructions. Left with all belongings,and discharge instructions. Patient discharged to home/self care.  Patient discharged via wheel chair by transporter to waiting family/S.O.       3/16/2023 2:31 PM

## 2023-03-16 NOTE — ANESTHESIA PRE PROCEDURE
Department of Anesthesiology  Preprocedure Note       Name:  Peyman Ramos   Age:  63 y.o.  :  1965                                          MRN:  [id number]         Date:  3/16/2023      Surgeon: Pro Jones):  Gifty Stewart MD    Procedure: Procedure(s):  ESOPHAGOGASTRODUODENOSCOPY  COLONOSCOPY    Medications prior to admission:   Prior to Admission medications    Medication Sig Start Date End Date Taking? Authorizing Provider   desvenlafaxine succinate (PRISTIQ) 25 MG TB24 extended release tablet Take 25 mg by mouth daily 3/14/22   Historical Provider, MD   Azelastine HCl 137 MCG/SPRAY SOLN  3/19/22   Historical Provider, MD   PROAIR  (71 Base) MCG/ACT inhaler  3/18/22   Historical Provider, MD   lansoprazole (PREVACID) 30 MG delayed release capsule  22   Historical Provider, MD   beclomethasone (QVAR) 80 MCG/ACT inhaler Inhale 2 puffs into the lungs 2 times daily 22   Melisa Mesa MD   vilazodone HCl (VIIBRYD) 10 MG TABS Take 10 mg by mouth daily  Patient not taking: No sig reported    Historical Provider, MD   spironolactone (ALDACTONE) 25 MG tablet TAKE 1 TABLET BY MOUTH TWO  TIMES DAILY 10/19/21   Yue Mullen MD   valACYclovir (VALTREX) 500 MG tablet Take 1 tablet by mouth 2 times daily 21   Yue Mullen MD   fluticasone Les Vanita) 50 MCG/ACT nasal spray 1 spray by Nasal route daily 10/16/19   Melisa Mesa MD   LORATADINE PO Take 1 tablet by mouth as needed    Historical Provider, MD   therapeutic multivitamin-minerals [facility]) tablet Take 1 tablet by mouth daily. Historical Provider, MD       Current medications:    No current facility-administered medications for this encounter.      Current Outpatient Medications   Medication Sig Dispense Refill    desvenlafaxine succinate (PRISTIQ) 25 MG TB24 extended release tablet Take 25 mg by mouth daily      Azelastine HCl 137 MCG/SPRAY SOLN       PROAIR  (90 Base) MCG/ACT inhaler       lansoprazole (PREVACID) 30 MG delayed release capsule       beclomethasone (QVAR) 80 MCG/ACT inhaler Inhale 2 puffs into the lungs 2 times daily 1 each 1    vilazodone HCl (VIIBRYD) 10 MG TABS Take 10 mg by mouth daily (Patient not taking: No sig reported)      spironolactone (ALDACTONE) 25 MG tablet TAKE 1 TABLET BY MOUTH TWO  TIMES DAILY 180 tablet 1    valACYclovir (VALTREX) 500 MG tablet Take 1 tablet by mouth 2 times daily 10 tablet 12    fluticasone (FLONASE) 50 MCG/ACT nasal spray 1 spray by Nasal route daily 3 Bottle 3    LORATADINE PO Take 1 tablet by mouth as needed      therapeutic multivitamin-minerals (THERAGRAN-M) tablet Take 1 tablet by mouth daily. Allergies:     Allergies   Allergen Reactions    Macrodantin [Nitrofurantoin]     Sulfa Antibiotics Hives       Problem List:    Patient Active Problem List   Diagnosis Code    Irritable bowel syndrome K58.9    Esophageal reflux K21.9    Migraine without aura G43.009    Allergic rhinitis J30.9    Asthma, exercise induced J45.990    ADD (attention deficit disorder) F98.8    Herpes simplex B00.9    Fibromyalgia M79.7    Depression with anxiety F41.8    Non morbid obesity, unspecified obesity type E66.9    Obstructive sleep apnea syndrome G47.33    HTN (hypertension) I10       Past Medical History:        Diagnosis Date    Benign neoplasm of breast     Chondromalacia of patellofemoral joint 9/2/2015    Elevated liver enzymes 4/19/2013    Obstructive sleep apnea syndrome 1/18/2021    Patellofemoral dysfunction 8/14/2012    Personal history of cervical dysplasia     Pilonidal cyst without mention of abscess     Sciatica of right side 11/28/2011    TMJ arthropathy 3/14/2014    Trochanteric bursitis of right hip 9/2/2015    Urethral stricture unspecified        Past Surgical History:        Procedure Laterality Date    ARTHROPLASTY      temporomandibular joint    ENDOMETRIAL ABLATION  18/3699    Dr. Garner Brood    cervical conization   • NASAL SEPTUM SURGERY     • POLYPECTOMY      nasal endoscopy   • SINUS SURGERY  03/2017    Endoscopic sinus surgery   • ONELIA AND BSO (CERVIX REMOVED) Bilateral 06/22/2021    DUB   • TONSILLECTOMY         Social History:    Social History     Tobacco Use   • Smoking status: Never   • Smokeless tobacco: Never   Substance Use Topics   • Alcohol use: No                                Counseling given: Not Answered      Vital Signs (Current): There were no vitals filed for this visit.                                           BP Readings from Last 3 Encounters:   03/31/22 (!) 148/85   03/30/22 (!) 170/100   11/23/21 134/76       NPO Status:                                                                                 BMI:   Wt Readings from Last 3 Encounters:   03/31/22 267 lb 3.2 oz (121.2 kg)   03/30/22 270 lb 6.4 oz (122.7 kg)   11/23/21 271 lb 12.8 oz (123.3 kg)     There is no height or weight on file to calculate BMI.    CBC:   Lab Results   Component Value Date/Time    WBC 6.4 04/04/2022 01:56 PM    RBC 5.01 04/04/2022 01:56 PM    HGB 12.8 04/04/2022 01:56 PM    HCT 39.2 04/04/2022 01:56 PM    MCV 78.3 04/04/2022 01:56 PM    RDW 14.2 04/04/2022 01:56 PM     04/04/2022 01:56 PM       CMP:   Lab Results   Component Value Date/Time     04/04/2022 01:56 PM    K 4.5 04/04/2022 01:56 PM     04/04/2022 01:56 PM    CO2 23 04/04/2022 01:56 PM    BUN 16 04/04/2022 01:56 PM    CREATININE 0.6 04/04/2022 01:56 PM    GFRAA >60 04/04/2022 01:56 PM    GFRAA >60 04/19/2013 09:47 AM    AGRATIO 1.7 04/04/2022 01:56 PM    LABGLOM >60 04/04/2022 01:56 PM    GLUCOSE 97 04/04/2022 01:56 PM    PROT 7.2 04/04/2022 01:56 PM    PROT 7.5 08/14/2012 03:54 PM    CALCIUM 9.8 04/04/2022 01:56 PM    BILITOT 0.6 04/04/2022 01:56 PM    ALKPHOS 113 04/04/2022 01:56 PM    AST 26 04/04/2022 01:56 PM    ALT 40 04/04/2022 01:56 PM       POC Tests: No results for input(s): POCGLU, POCNA, POCK, POCCL, POCBUN,  Prachi Mojica in the last 72 hours. Coags: No results found for: PROTIME, INR, APTT    HCG (If Applicable): No results found for: PREGTESTUR, PREGSERUM, HCG, HCGQUANT     ABGs: No results found for: PHART, PO2ART, FTV6UTG, JGG2ZIK, BEART, P3WRTHCI     Type & Screen (If Applicable):  No results found for: LABABO, LABRH    Drug/Infectious Status (If Applicable):  No results found for: HIV, HEPCAB    COVID-19 Screening (If Applicable):   Lab Results   Component Value Date/Time    COVID19 NOT DETECTED 01/07/2021 01:40 PM           Anesthesia Evaluation  Patient summary reviewed and Nursing notes reviewed no history of anesthetic complications:   Airway: Mallampati: III  TM distance: >3 FB   Neck ROM: full  Mouth opening: > = 3 FB   Dental: normal exam         Pulmonary:normal exam    (+) sleep apnea:  asthma:                            Cardiovascular:  Exercise tolerance: good (>4 METS),   (+) hypertension:,     (-)  angina, orthopnea and PND      Rhythm: regular  Rate: normal           Beta Blocker:  Not on Beta Blocker         Neuro/Psych:   (+) headaches:, psychiatric history:depression/anxiety    (-) neuromuscular disease           GI/Hepatic/Renal:   (+) GERD:, morbid obesity          Endo/Other:                     Abdominal:   (+) obese,     Abdomen: soft. Vascular: Other Findings:           Anesthesia Plan      MAC     ASA 3       Induction: intravenous. MIPS: Postoperative opioids intended and Prophylactic antiemetics administered. Anesthetic plan and risks discussed with patient. Plan discussed with CRNA and attending.                     Erica Mathews DO   3/16/2023

## 2023-03-16 NOTE — ANESTHESIA POSTPROCEDURE EVALUATION
Department of Anesthesiology  Postprocedure Note    Patient: Stephany Shankar  MRN: [id number]  YOB: 1965  Date of evaluation: 3/16/2023      Procedure Summary     Date: 03/16/23 Room / Location: [address] [place] [address] Nakul Mortensen  / [facility]    Anesthesia Start: 1306 Anesthesia Stop: 1405    Procedures:       EGD BIOPSY      COLONOSCOPY POLYPECTOMY SNARE/COLD BIOPSY Diagnosis:       Irritable bowel syndrome with diarrhea      GERD without esophagitis      (Irritable bowel syndrome with diarrhea [K58.0])      (GERD without esophagitis [K21.9])    Surgeons: Boubacar Benton MD Responsible Provider: Ashwin Akbar DO    Anesthesia Type: MAC ASA Status: 3          Anesthesia Type: No value filed.     Geeta Phase I: Geeta Score: 10    Geeta Phase II: Geeta Score: 10      Anesthesia Post Evaluation    Patient location during evaluation: PACU  Patient participation: complete - patient participated  Level of consciousness: awake  Pain score: 0  Airway patency: patent  Nausea & Vomiting: no nausea and no vomiting  Complications: no  Cardiovascular status: blood pressure returned to baseline  Respiratory status: acceptable  Hydration status: euvolemic  Multimodal analgesia pain management approach

## 2023-03-16 NOTE — H&P
Gastroenterology Note             Pre-operative History and Physical    Patient: Dengma Mask  : 1965  CSN: [id number]    History Obtained From:  Patient and/or guardian. HISTORY OF PRESENT ILLNESS:    Indication: The patient is a 63 y.o. female  here for EGD/colonoscopy. Chronic GERD with recent worsening on famotidine. Need for colon cancer screening. Past Medical History:    Past Medical History:   Diagnosis Date    Abnormally small mouth     states dentist said she had a small mouth    Benign neoplasm of breast     Chondromalacia of patellofemoral joint 2015    Elevated liver enzymes 2013    Obstructive sleep apnea syndrome 2021    Patellofemoral dysfunction 2012    Personal history of cervical dysplasia     Pilonidal cyst without mention of abscess     Sciatica of right side 2011    TMJ arthropathy 2014    Trochanteric bursitis of right hip 2015    Urethral stricture unspecified      Past Surgical History:    Past Surgical History:   Procedure Laterality Date    ARTHROPLASTY      temporomandibular joint    ENDOMETRIAL ABLATION      Dr. Robina LEAL      cervical conization    NASAL SEPTUM SURGERY      POLYPECTOMY      nasal endoscopy    SINUS SURGERY  2017    Endoscopic sinus surgery    ONELIA AND BSO (CERVIX REMOVED) Bilateral 2021    DUB    TONSILLECTOMY       Medications Prior to Admission:   No current facility-administered medications on file prior to encounter.      Current Outpatient Medications on File Prior to Encounter   Medication Sig Dispense Refill    famotidine (PEPCID) 40 MG tablet Take 40 mg by mouth 2 times daily      fexofenadine (ALLEGRA) 180 MG tablet Take 180 mg by mouth daily      desvenlafaxine succinate (PRISTIQ) 25 MG TB24 extended release tablet Take 25 mg by mouth daily      Azelastine HCl 137 MCG/SPRAY SOLN       PROAIR  (90 Base) MCG/ACT inhaler  (Patient not taking: Reported on 3/16/2023) beclomethasone (QVAR) 80 MCG/ACT inhaler Inhale 2 puffs into the lungs 2 times daily (Patient not taking: Reported on 3/16/2023) 1 each 1    fluticasone (FLONASE) 50 MCG/ACT nasal spray 1 spray by Nasal route daily 3 Bottle 3    therapeutic multivitamin-minerals (THERAGRAN-M) tablet Take 1 tablet by mouth daily. (Patient not taking: Reported on 3/16/2023)          Allergies:  Macrodantin [nitrofurantoin] and Sulfa antibiotics      Social History:   Social History     Tobacco Use    Smoking status: Never    Smokeless tobacco: Never   Substance Use Topics    Alcohol use: No     Family History:   Family History   Problem Relation Age of Onset    Cancer Mother         skin cancer    Other Father         PVD    Other Brother         a&w    Hypertension Paternal Grandmother     Other Paternal Grandfather         myeloma       PHYSICAL EXAM:      /73   Pulse 77   Temp 98.1 °F (36.7 °C) (Temporal)   Resp 18   Ht 5' 6.5\" (1.689 m)   Wt 275 lb (124.7 kg)   LMP 07/07/2018   SpO2 97%   BMI 43.72 kg/m²  I        Heart:   RRR, normal s1s2    Lungs:  CTA bilat,  Normal effort    Abdomen:   NT, ND      ASA Grade:  ASA 3 - Patient with moderate systemic disease with functional limitations    Mallampati Class: 3          ASSESSMENT AND PLAN:    1. Patient is a 63 y.o. female here for EGD/Colonoscopy with MAC.   2.  Procedure options, risks, and benefits reviewed with patient who expresses understanding and consent.     Luz Marina Kebede MD,   [facility]  3/16/2023

## 2023-03-16 NOTE — DISCHARGE INSTRUCTIONS
ENDOSCOPY DISCHARGE INSTRUCTIONS:    Call the physician that did your procedure for any questions or concern:    [facility]: [phone]  DR. CRISS KOHLER      ACTIVITY:    There are potential side effects to the medications used for sedation and anesthesia during your procedure. These include:  Dizziness or light-headedness, confusion or memory loss, delayed reaction times, loss of coordination, nausea and vomiting. Because of your increased risk for injury, we ask that you observe the following precautions: For the next 24 hours,  DO NOT operate an automobile, bicycle, motorcycle, , power tools or large equipment of any kind. Do not drink alcohol, sign any legal documents or make any legal decisions for 24 hours. Do not bend your head over lower than your heart. DO sit on the side of bed/couch awhile before getting up. Plan on bedrest or quiet relaxation today. You may resume normal activities in 24 hours. DIET:    Your first meal today should be light, avoiding spicy and fatty foods. If you tolerate this first meal, then you may advance to your regular diet unless otherwise advised by your physician. NORMAL SYMPTOMS:  -Mild sore throat if youve had an EGD   -Gaseous discomfort    NOTIFY YOUR PHYSICIAN IF THESE SYMPTOMS OCCUR:  1. Fever (greater than 100)  5. Increased abdominal bloating  2. Severe pain    6. Excessive bleeding  3. Nausea and vomiting  7. Chest pain                                                                    4. Chills    8. Shortness of breath    ADDITIONAL INSTRUCTIONS:    Biopsy results: Call [address],[facility] for biopsy results in 1 week    Educational Information:    Follow up/repeat 3-5years      Please review these discharge instructions this evening or tomorrow for  information you may have forgotten. We want to thank you for choosing the [facility] as your health care provider.  We always strive to provide you with excellent care while you are here. You may receive a survey in the mail regarding your care. We would appreciate you taking a few minutes of your time to complete this survey.

## 2023-03-16 NOTE — OP NOTE
EGD and Colonoscopy Procedure  Note            Patient: Kobi Andrews  : 1965  CSN: [id number]    Procedure: Esophagogastroduodenoscopy with biopsy and Colonoscopy with cold snare polypectomy      Date:  3/16/2023     Surgeon:  Tita Goss MD     Referring Physician:   Js Peña    Preoperative Diagnosis:  GERD    Postoperative Diagnosis:  As below in impressions. Anesthesia:  MAC per anesthesia record     EBL: <5 mL    Indications: This is a 63y.o. year old female who presents today with  history of chronic GERD. Need for Real's esophagus screening. Need for colon cancer screening with last colonoscopy in . Notes GERD currently poorly controlled with use of famotidine. EGD Procedure Details:    Informed consent was obtained from the patient after explanation of indications, benefits and possible risks and complications of the procedures. The patient was then taken to the endoscopy suite, placed in the left lateral decubitus position, and the above IV sedation was administrered. The Olympus gastroscope was passed through the hypopharynx into the esophagus. The scope was advanced to the second portion of the duodenum. The mucosa was carefully examined, including gastric retroflexion. The scope was withdrawn and the procedure was terminated with findings as indicated below:     EGD Findings:     Esophagus:   -Z-line was noted at 38cm with a 2cm sliding hiatal hernia intermittently noted. -A 5mm erosion, consistent with LA grade A esophagitis was noted. -The esophagus was otherwise normal. There was no evidence of Real's esophagus. Stomach:   -Several small erosions with scant surrounding gastritis noted in the gastric antrum. 2 focal patches of congested mildly erythematous mucosa noted in the gastric body. Biopsies were obtained with a cold forceps from the antrum, body, and incisura to assess for H. pylori.   -The stomach was otherwise normal. Duodenum:   -The examined duodenum was normal.     Gastric or Duodenal ulcer present: No      Colonoscopy Procedure Details: An informed consent was obtained from the patient after explanation of indications, benefits, possible risks and complications of the procedure. Following completion of the EGD, the patient remained in the left lateral decubitus position. Additional IV sedation was administered as needed to maintain patient comfort. The Olympus colonoscope was inserted through the anus into the rectum. The scope was then carefully advanced through the length of the colon to the terminal ileum. The ileocecal valve and appendiceal orifice was visualized. The quality of preparation was good. Water was used to clear remaining stool from the colon to allow for careful examination of the mucosa with insufflation. The scope was carefully withdrawn for more than 6 minutes. Retroflexion was performed in the rectum. The scope was then straightened, the colon was decompressed, and the scope was withdrawn. Findings:   -Perianal and digital rectal exam: Normal  -Rectum: Normal  -Sigmoid: An 8 mm semipedunculated polyp and a 4 mm sessile polyp removed completely from the distal sigmoid and sent for histology.  -Descending: Normal  -Transverse: 3 sessile polyps ranging in size from 3 to 5 mm removed with cold snare and sent for histology. -Ascendin mm sessile polyp removed completely with cold snare and sent for histology with transverse polyps. -Cecum: Normal  -Terminal ileum: Examined for 5cm proximal to the ileocecal valve and was normal.      The patient tolerated the procedure well and was taken to the PACU in good condition. Complications: No immediate complications.      Impression:    -Small hiatal hernia  -LA grade a erosive esophagitis  -Mild erosive gastritis  -Colonic polyps x6    Recommendations:    -Await pathology results  -Repeat colonoscopy in 3 or 5 years on the basis of polyp histology  -Treat H. pylori if noted on gastric biopsy  -Can return to use of Prevacid for control of acid reflux given lack of control with current Pepcid.     Janice Flores, [address] [place] [address]  3/16/2023

## 2023-08-02 NOTE — PROGRESS NOTES
[address] [facility] [place] Tang  1965  [address] [address] [place]  [phone] (home)   [phone] (mobile)      Insurance Info (confirm with patient if correct):  Payer/Plan Subscr  Sex Relation Sub.  Ins. ID Effective Group Num

## 2023-12-14 ENCOUNTER — TELEPHONE (OUTPATIENT)
Dept: PULMONOLOGY | Age: 58
End: 2023-12-14

## 2023-12-14 NOTE — TELEPHONE ENCOUNTER
Pt called in regards to the full face mask she has now for machine is messing with her left eye and is causing it to be swollen when she wakes up in the morning. Pt had called [facility] to see if they would give her a new mask but they wouldn't without an order from provider. Pt asking if she could have an order for new mask sent over to [facility]. Pt would prefer nose pillow or something similar. Please advise.     Ph. [phone]

## 2023-12-29 ENCOUNTER — TELEPHONE (OUTPATIENT)
Dept: PULMONOLOGY | Age: 58
End: 2023-12-29

## 2023-12-29 NOTE — TELEPHONE ENCOUNTER
Pt called in stating that she switched from a full face mask to a nasal mask and now feels the pressure is too high. Pt is wondering if the mask is making a difference and wonders if her pressure should be decreased.      Myke Johnston

## 2023-12-29 NOTE — TELEPHONE ENCOUNTER
When did she switch to the nasal mask? It appears her events have increased some over the past two nights. Sometimes, the pressure may feel higher when switching from a full face mask to a nasal mask due to the surface area the pressure is being pushed through. When the pressure is dispersed over a larger area such as with the full face mask it may not feel as high as compared to the pressure being pushed through a smaller area with a nasal mask and may feel stronger. If she would prefer, can try lowering the pressure but if she notices she is not sleeping as well, waking as rested, or feels it is too low she will need to contact the office to return the higher pressure. Changes sent via machine modem.

## 2024-02-07 NOTE — PROGRESS NOTES
Diagnosis: [x] ALEXIA (G47.33) [] CSA (G47.31) [] Apnea (G47.30)   Length of Need: [x] 15 Months [] 99 Months [] Other:   Machine (JESSY!): [] Respironics Dream Station      Auto [] ResMed AirSense     Auto [] Other:     []  CPAP () [] Bilevel ()   Mode: [] Auto [] Spontaneous    Mode: [] Auto [] Spontaneous             Comfort Settings:      Humidifier: [] Heated ()        [x] Water chamber replacement ()/ 1 per 6 months        Mask:   [] Nasal () /1 per 3 months [x] Full Face () /1 per 3 months   [] Patient choice -Size and fit mask [x] Patient Choice - Size and fit mask   [] Dispense: [] Dispense:   [] Headgear () / 1 per 3 months [x] Headgear () / 1 per 3 months   [] Replacement Nasal Cushion ()/2 per month [x] Interface Replacement ()/1 per month   [] Replacement Nasal Pillows ()/2 per month         Tubing: [x] Heated ()/1 per 3 months    [] Standard ()/1 per 3 months [] Other:           Filters: [x] Non-disposable ()/1 per 6 months     [x] Ultra-Fine, Disposable ()/2 per month        Miscellaneous: [] Chin Strap ()/ 1 per 6 months [] O2 bleed-in:        LPM   [] Oxymetry on CPAP/Bilevel []  Other:         Start Order Date: 02/07/24    MEDICAL JUSTIFICATION:  I, the undersigned, certify that the above prescribed supplies are medically necessary for this patient’s wellbeing.  In my opinion, the supplies are both reasonable and necessary in reference to accepted standards of medicalpractice in treatment of this patient’s condition.    SMILEY LEE NP    NPI: [id number]       Order Signed Date: 02/07/24  [facility]  Pulmonary, Sleep, and Critical Care    Pulmonary, Sleep, and Critical Care  [id number] [address]                          [address] [place] [address] [address]  [place] [address]                                    [place] [address]  Phone: [phone]    Fax:

## 2024-02-07 NOTE — PROGRESS NOTES
Diagnosis: [x] ALEXIA (G47.33) [] CSA (G47.31) [] Apnea (G47.30)   Length of Need: [x] 15 Months [] 99 Months [] Other:   Machine (JESSY!): [] Respironics Dream Station      Auto [] ResMed AirSense     Auto [] Other:     []  CPAP () [] Bilevel ()   Mode: [] Auto [] Spontaneous    Mode: [] Auto [] Spontaneous             Comfort Settings:      Humidifier: [] Heated ()        [x] Water chamber replacement ()/ 1 per 6 months        Mask:   [] Nasal () /1 per 3 months [x] Full Face () /1 per 3 months   [] Patient choice -Size and fit mask [x] Patient Choice - Size and fit mask   [] Dispense: [] Dispense:Airtouch F20   [] Headgear () / 1 per 3 months [x] Headgear () / 1 per 3 months   [] Replacement Nasal Cushion ()/2 per month [x] Interface Replacement ()/1 per month   [] Replacement Nasal Pillows ()/2 per month         Tubing: [x] Heated ()/1 per 3 months    [] Standard ()/1 per 3 months [] Other:           Filters: [x] Non-disposable ()/1 per 6 months     [x] Ultra-Fine, Disposable ()/2 per month        Miscellaneous: [] Chin Strap ()/ 1 per 6 months [] O2 bleed-in:        LPM   [] Oxymetry on CPAP/Bilevel []  Other:         Start Order Date: 02/07/24    MEDICAL JUSTIFICATION:  I, the undersigned, certify that the above prescribed supplies are medically necessary for this patient’s wellbeing.  In my opinion, the supplies are both reasonable and necessary in reference to accepted standards of medicalpractice in treatment of this patient’s condition.    SMILEY LEE NP    NPI: [id number]       Order Signed Date: 02/07/24  [facility]  Pulmonary, Sleep, and Critical Care    Pulmonary, Sleep, and Critical Care  [facility]                          [address] [place] [address], [address]  [place] [address]                                    [place] [address]  Phone: [phone]    Fax:

## 2024-04-01 NOTE — ASSESSMENT & PLAN NOTE
Patient encouraged to work on maintaining a healthy weight per height. Achievable with diet restriction/modifications and exercise (may consult primary care if unsure of any restrictions or concerns). Weight management directly correlates to risk of control and maintenance of Obstructive Sleep Apnea. 0

## 2024-08-14 ENCOUNTER — OFFICE VISIT (OUTPATIENT)
Dept: PULMONOLOGY | Age: 59
End: 2024-08-14
Payer: COMMERCIAL

## 2024-08-14 VITALS
OXYGEN SATURATION: 97 % | BODY MASS INDEX: 45.99 KG/M2 | WEIGHT: 293 LBS | SYSTOLIC BLOOD PRESSURE: 136 MMHG | DIASTOLIC BLOOD PRESSURE: 84 MMHG | HEIGHT: 67 IN | HEART RATE: 88 BPM

## 2024-08-14 DIAGNOSIS — E66.9 NON MORBID OBESITY, UNSPECIFIED OBESITY TYPE: Chronic | ICD-10-CM

## 2024-08-14 DIAGNOSIS — I10 HYPERTENSION, UNSPECIFIED TYPE: ICD-10-CM

## 2024-08-14 DIAGNOSIS — G47.33 OBSTRUCTIVE SLEEP APNEA SYNDROME: Primary | ICD-10-CM

## 2024-08-14 PROCEDURE — 3079F DIAST BP 80-89 MM HG: CPT | Performed by: NURSE PRACTITIONER

## 2024-08-14 PROCEDURE — G2211 COMPLEX E/M VISIT ADD ON: HCPCS | Performed by: NURSE PRACTITIONER

## 2024-08-14 PROCEDURE — 3075F SYST BP GE 130 - 139MM HG: CPT | Performed by: NURSE PRACTITIONER

## 2024-08-14 PROCEDURE — 99214 OFFICE O/P EST MOD 30 MIN: CPT | Performed by: NURSE PRACTITIONER

## 2024-08-14 ASSESSMENT — SLEEP AND FATIGUE QUESTIONNAIRES
HOW LIKELY ARE YOU TO NOD OFF OR FALL ASLEEP WHILE SITTING AND READING: SLIGHT CHANCE OF DOZING
ESS TOTAL SCORE: 3
HOW LIKELY ARE YOU TO NOD OFF OR FALL ASLEEP WHILE SITTING INACTIVE IN A PUBLIC PLACE: WOULD NEVER DOZE
HOW LIKELY ARE YOU TO NOD OFF OR FALL ASLEEP WHEN YOU ARE A PASSENGER IN A CAR FOR AN HOUR WITHOUT A BREAK: SLIGHT CHANCE OF DOZING
HOW LIKELY ARE YOU TO NOD OFF OR FALL ASLEEP WHILE SITTING AND TALKING TO SOMEONE: WOULD NEVER DOZE
HOW LIKELY ARE YOU TO NOD OFF OR FALL ASLEEP WHILE LYING DOWN TO REST IN THE AFTERNOON WHEN CIRCUMSTANCES PERMIT: SLIGHT CHANCE OF DOZING
HOW LIKELY ARE YOU TO NOD OFF OR FALL ASLEEP IN A CAR, WHILE STOPPED FOR A FEW MINUTES IN TRAFFIC: WOULD NEVER DOZE
HOW LIKELY ARE YOU TO NOD OFF OR FALL ASLEEP WHILE SITTING QUIETLY AFTER LUNCH WITHOUT ALCOHOL: WOULD NEVER DOZE

## 2024-08-14 NOTE — PROGRESS NOTES
[facility]  [address]  [address]  [place] [address]  P- [phone]  F- [phone]   [facility] PHYSICIANS [place] [facility] SLEEP MEDICINE  [address]  [address]  [facility] [address]  Dept: [phone]  Dept Fax: [phone]  Loc: [phone]      Assessment/Plan:      1. Obstructive sleep apnea syndrome  Assessment & Plan:  Chronic-with progression/exacerbation: Reviewed and analyzed results of physiologic download from patient's machine and reviewed with patient.  Supplies and parts as needed for her machine.  These are medically necessary.  Limit caffeine use after 3pm. Based on the analyzed data will continue with current settings. Stable on her machine at current settings, getting benefit from the use, and having minimal side effects.  Encouraged her to use her machine is much as possible.  Will see her back in 6 months.  Encouraged her to contact the office of any questions or concerns      Encouraged consistent use of her machine each night, all night.  Discussed the importance of treating ALEXIA from a physiological standpoint.  Instructed not to drive unless had 4 hrs of effective therapy for her ALEXIA the night before.  No driving when sleepy.  Did review the risks of under or untreated ALEXIA including, but not limited to, higher risks of motor vehicle accidents, stroke, heart attacks, and death.  She understands and accepts all these risks.    2. Hypertension, unspecified type  Assessment & Plan:   Chronic- Stable.  Discussed the importance of treating obstructive sleep apnea as part of the management of this disorder.  Cont any meds per PCP and other physicians.    3. Non morbid obesity, unspecified obesity type  Assessment & Plan:   Chronic-not stable:  Discussed importance of treating obstructive sleep apnea and getting sufficient sleep to assist with weight control.  Discussed weight gain and/or weight loss

## 2024-08-14 NOTE — ASSESSMENT & PLAN NOTE
Chronic-with progression/exacerbation: Reviewed and analyzed results of physiologic download from patient's machine and reviewed with patient.  Supplies and parts as needed for her machine.  These are medically necessary.  Limit caffeine use after 3pm. Based on the analyzed data will continue with current settings. Stable on her machine at current settings, getting benefit from the use, and having minimal side effects.  Encouraged her to use her machine is much as possible.  Will see her back in 6 months.  Encouraged her to contact the office of any questions or concerns      Encouraged consistent use of her machine each night, all night.  Discussed the importance of treating ALEXIA from a physiological standpoint.  Instructed not to drive unless had 4 hrs of effective therapy for her ALEXIA the night before.  No driving when sleepy.  Did review the risks of under or untreated ALEXIA including, but not limited to, higher risks of motor vehicle accidents, stroke, heart attacks, and death.  She understands and accepts all these risks.

## 2025-02-19 ASSESSMENT — SLEEP AND FATIGUE QUESTIONNAIRES
HOW LIKELY ARE YOU TO NOD OFF OR FALL ASLEEP WHEN YOU ARE A PASSENGER IN A CAR FOR AN HOUR WITHOUT A BREAK: SLIGHT CHANCE OF DOZING
HOW LIKELY ARE YOU TO NOD OFF OR FALL ASLEEP WHILE SITTING INACTIVE IN A PUBLIC PLACE: WOULD NEVER DOZE
HOW LIKELY ARE YOU TO NOD OFF OR FALL ASLEEP IN A CAR, WHILE STOPPED FOR A FEW MINUTES IN TRAFFIC: WOULD NEVER DOZE
HOW LIKELY ARE YOU TO NOD OFF OR FALL ASLEEP WHILE SITTING AND READING: MODERATE CHANCE OF DOZING
HOW LIKELY ARE YOU TO NOD OFF OR FALL ASLEEP WHILE WATCHING TV: SLIGHT CHANCE OF DOZING
HOW LIKELY ARE YOU TO NOD OFF OR FALL ASLEEP IN A CAR, WHILE STOPPED FOR A FEW MINUTES IN TRAFFIC: WOULD NEVER DOZE
HOW LIKELY ARE YOU TO NOD OFF OR FALL ASLEEP WHILE SITTING QUIETLY AFTER LUNCH WITHOUT ALCOHOL: SLIGHT CHANCE OF DOZING
HOW LIKELY ARE YOU TO NOD OFF OR FALL ASLEEP WHILE SITTING INACTIVE IN A PUBLIC PLACE: WOULD NEVER DOZE
HOW LIKELY ARE YOU TO NOD OFF OR FALL ASLEEP WHEN YOU ARE A PASSENGER IN A CAR FOR AN HOUR WITHOUT A BREAK: SLIGHT CHANCE OF DOZING
HOW LIKELY ARE YOU TO NOD OFF OR FALL ASLEEP WHILE LYING DOWN TO REST IN THE AFTERNOON WHEN CIRCUMSTANCES PERMIT: MODERATE CHANCE OF DOZING
HOW LIKELY ARE YOU TO NOD OFF OR FALL ASLEEP WHILE SITTING AND TALKING TO SOMEONE: WOULD NEVER DOZE
HOW LIKELY ARE YOU TO NOD OFF OR FALL ASLEEP WHILE SITTING AND READING: MODERATE CHANCE OF DOZING
HOW LIKELY ARE YOU TO NOD OFF OR FALL ASLEEP WHILE SITTING QUIETLY AFTER LUNCH WITHOUT ALCOHOL: SLIGHT CHANCE OF DOZING
HOW LIKELY ARE YOU TO NOD OFF OR FALL ASLEEP WHILE WATCHING TV: SLIGHT CHANCE OF DOZING
HOW LIKELY ARE YOU TO NOD OFF OR FALL ASLEEP WHILE LYING DOWN TO REST IN THE AFTERNOON WHEN CIRCUMSTANCES PERMIT: MODERATE CHANCE OF DOZING
HOW LIKELY ARE YOU TO NOD OFF OR FALL ASLEEP WHILE SITTING AND TALKING TO SOMEONE: WOULD NEVER DOZE
ESS TOTAL SCORE: 7

## 2025-02-20 ENCOUNTER — OFFICE VISIT (OUTPATIENT)
Age: 60
End: 2025-02-20
Payer: COMMERCIAL

## 2025-02-20 VITALS
HEART RATE: 90 BPM | DIASTOLIC BLOOD PRESSURE: 83 MMHG | WEIGHT: 293 LBS | SYSTOLIC BLOOD PRESSURE: 145 MMHG | BODY MASS INDEX: 45.99 KG/M2 | HEIGHT: 67 IN | OXYGEN SATURATION: 94 %

## 2025-02-20 DIAGNOSIS — E66.9 NON MORBID OBESITY, UNSPECIFIED OBESITY TYPE: Chronic | ICD-10-CM

## 2025-02-20 DIAGNOSIS — I10 HYPERTENSION, UNSPECIFIED TYPE: ICD-10-CM

## 2025-02-20 DIAGNOSIS — G47.33 OBSTRUCTIVE SLEEP APNEA SYNDROME: Primary | ICD-10-CM

## 2025-02-20 PROCEDURE — G2211 COMPLEX E/M VISIT ADD ON: HCPCS | Performed by: NURSE PRACTITIONER

## 2025-02-20 PROCEDURE — 3077F SYST BP >= 140 MM HG: CPT | Performed by: NURSE PRACTITIONER

## 2025-02-20 PROCEDURE — 3079F DIAST BP 80-89 MM HG: CPT | Performed by: NURSE PRACTITIONER

## 2025-02-20 PROCEDURE — 99214 OFFICE O/P EST MOD 30 MIN: CPT | Performed by: NURSE PRACTITIONER

## 2025-02-20 RX ORDER — MULTIVITAMIN WITH IRON
1 TABLET ORAL DAILY
COMMUNITY

## 2025-02-20 NOTE — PROGRESS NOTES
[facility]  [address]  [address]  [place] [address]  P- [phone]  F- [phone]   [facility] PHYSICIANS [place] [facility] SLEEP MEDICINE  [address] [facility] [address]  [place] [address]  Dept: [phone]  Dept Fax: [phone]  Loc: [phone]      Assessment/Plan:      1. Obstructive sleep apnea syndrome  Assessment & Plan:  Chronic-with progression/exacerbation: Reviewed and analyzed results of physiologic download from patient's machine and reviewed with patient.  Supplies and parts as needed for her machine.  These are medically necessary.  Limit caffeine use after 3pm. Based on the analyzed data will change following settings: P min increased to 12.  Will trial pressure increase.  Encouraged her to use her machine as much as possible.  Will see her back in 6 months.  Encouraged her to contact the office any questions or concerns    Encouraged consistent use of her machine each night, all night.  Discussed the importance of treating ALEXIA from a physiological standpoint.  Instructed not to drive unless had 4 hrs of effective therapy for her ALEXIA the night before.  No driving when sleepy.  Did review the risks of under or untreated ALEXIA including, but not limited to, higher risks of motor vehicle accidents, stroke, heart attacks, and death.  She understands and accepts all these risks.    2. Hypertension, unspecified type  Assessment & Plan:  Chronic- Stable.  Discussed the importance of treating obstructive sleep apnea as part of the management of this disorder.  Cont any meds per PCP and other physicians.    3. Non morbid obesity, unspecified obesity type  Assessment & Plan:  Chronic-not stable:  Discussed importance of treating obstructive sleep apnea and getting sufficient sleep to assist with weight control.  Discussed weight gain and/or weight loss may require adjustments to machine settings. Encouraged

## 2025-02-20 NOTE — PROGRESS NOTES
Diagnosis: [x] ALEXIA (G47.33) [] CSA (G47.31) [] Apnea (G47.30)   Length of Need: [x] 15 Months [] 99 Months [] Other:   Machine (JESSY!): [] Respironics Dream Station      Auto [] ResMed AirSense     Auto [] Other:     []  CPAP () [] Bilevel ()   Mode: [] Auto [] Spontaneous    Mode: [] Auto [] Spontaneous             Comfort Settings:      Humidifier: [] Heated ()        [x] Water chamber replacement ()/ 1 per 6 months        Mask:   [] Nasal () /1 per 3 months [x] Full Face () /1 per 3 months   [] Patient choice -Size and fit mask [x] Patient Choice - Size and fit mask   [] Dispense: [] Dispense:   [] Headgear () / 1 per 3 months [x] Headgear () / 1 per 3 months   [] Replacement Nasal Cushion ()/2 per month [x] Interface Replacement ()/1 per month   [] Replacement Nasal Pillows ()/2 per month         Tubing: [x] Heated ()/1 per 3 months    [] Standard ()/1 per 3 months [] Other:           Filters: [x] Non-disposable ()/1 per 6 months     [x] Ultra-Fine, Disposable ()/2 per month        Miscellaneous: [] Chin Strap ()/ 1 per 6 months [] O2 bleed-in:        LPM   [] Oxymetry on CPAP/Bilevel []  Other:         Start Order Date: 02/20/25    MEDICAL JUSTIFICATION:  I, the undersigned, certify that the above prescribed supplies are medically necessary for this patient’s wellbeing.  In my opinion, the supplies are both reasonable and necessary in reference to accepted standards of medicalpractice in treatment of this patient’s condition.    SMILEY LEE NP    NPI: [id number]       Order Signed Date: 02/20/25  [facility]  Pulmonary, Sleep, and Critical Care    Pulmonary, Sleep, and Critical Care  [facility] [address]                          [address] [place] [address] [address]  [place] [address]                                    [place] [address]  Phone: [phone]    Fax:  Dosing Month 1 (Required For Cumulative Dosing): 20mg BID

## 2025-02-20 NOTE — ASSESSMENT & PLAN NOTE
Chronic-with progression/exacerbation: Reviewed and analyzed results of physiologic download from patient's machine and reviewed with patient.  Supplies and parts as needed for her machine.  These are medically necessary.  Limit caffeine use after 3pm. Based on the analyzed data will change following settings: P min increased to 12.  Will trial pressure increase.  Encouraged her to use her machine as much as possible.  Will see her back in 6 months.  Encouraged her to contact the office any questions or concerns    Encouraged consistent use of her machine each night, all night.  Discussed the importance of treating ALEXIA from a physiological standpoint.  Instructed not to drive unless had 4 hrs of effective therapy for her ALEXIA the night before.  No driving when sleepy.  Did review the risks of under or untreated ALEXIA including, but not limited to, higher risks of motor vehicle accidents, stroke, heart attacks, and death.  She understands and accepts all these risks.

## (undated) DEVICE — SNARE COLD DIAMOND 10MM THIN

## (undated) DEVICE — TRAP POLYP ETRAP

## (undated) DEVICE — FORCEPS BX L240CM JAW DIA2.4MM ORNG L CAP W/ NDL DISP RAD